# Patient Record
Sex: FEMALE | Race: WHITE | NOT HISPANIC OR LATINO | Employment: OTHER | URBAN - METROPOLITAN AREA
[De-identification: names, ages, dates, MRNs, and addresses within clinical notes are randomized per-mention and may not be internally consistent; named-entity substitution may affect disease eponyms.]

---

## 2017-10-23 ENCOUNTER — OFFICE VISIT (OUTPATIENT)
Dept: PODIATRY | Facility: CLINIC | Age: 72
End: 2017-10-23
Payer: MEDICARE

## 2017-10-23 VITALS
WEIGHT: 143.31 LBS | DIASTOLIC BLOOD PRESSURE: 76 MMHG | SYSTOLIC BLOOD PRESSURE: 121 MMHG | BODY MASS INDEX: 24.22 KG/M2 | HEART RATE: 88 BPM

## 2017-10-23 DIAGNOSIS — L60.0 INGROWN NAIL: ICD-10-CM

## 2017-10-23 DIAGNOSIS — M79.675 PAIN IN TOES OF BOTH FEET: Primary | ICD-10-CM

## 2017-10-23 DIAGNOSIS — M79.674 PAIN IN TOES OF BOTH FEET: Primary | ICD-10-CM

## 2017-10-23 DIAGNOSIS — B35.1 ONYCHOMYCOSIS DUE TO DERMATOPHYTE: ICD-10-CM

## 2017-10-23 PROCEDURE — 99212 OFFICE O/P EST SF 10 MIN: CPT | Mod: PBBFAC | Performed by: PODIATRIST

## 2017-10-23 PROCEDURE — 99214 OFFICE O/P EST MOD 30 MIN: CPT | Mod: S$PBB,,, | Performed by: PODIATRIST

## 2017-10-23 PROCEDURE — 99999 PR PBB SHADOW E&M-EST. PATIENT-LVL II: CPT | Mod: PBBFAC,,, | Performed by: PODIATRIST

## 2017-10-23 RX ORDER — PRAVASTATIN SODIUM 40 MG/1
TABLET ORAL
COMMUNITY
Start: 2017-10-16

## 2017-10-23 RX ORDER — LIOTHYRONINE SODIUM 5 UG/1
TABLET ORAL
COMMUNITY
Start: 2017-10-11

## 2017-10-23 RX ORDER — TERBINAFINE HYDROCHLORIDE 250 MG/1
250 TABLET ORAL DAILY
Qty: 30 TABLET | Refills: 0 | Status: SHIPPED | OUTPATIENT
Start: 2017-10-23 | End: 2017-11-20 | Stop reason: SDUPTHER

## 2017-10-23 RX ORDER — NAPROXEN 500 MG/1
TABLET ORAL
COMMUNITY
Start: 2017-09-03

## 2017-10-23 RX ORDER — MONTELUKAST SODIUM 10 MG/1
TABLET ORAL
COMMUNITY
Start: 2017-08-11

## 2017-10-23 RX ORDER — IRBESARTAN AND HYDROCHLOROTHIAZIDE 300; 12.5 MG/1; MG/1
TABLET, FILM COATED ORAL
COMMUNITY
Start: 2017-10-20

## 2017-10-23 RX ORDER — TRAMADOL HYDROCHLORIDE 50 MG/1
TABLET ORAL
COMMUNITY
Start: 2017-08-25

## 2017-10-23 RX ORDER — LEVOTHYROXINE SODIUM 112 UG/1
TABLET ORAL
COMMUNITY
Start: 2017-10-19

## 2017-10-23 NOTE — PROGRESS NOTES
CC:  toenails      HPI: Wendi Edmonds is a 71 y.o. female who presents today with fungal thickened hallux toenails, present for years. No treatment tried yet.    history of right foot bunionectomy and fusion.  history of right foot drop.  She would like treatment for her fungal toenails.  Also wondering if they're ingrown.      PCP:  Tatianna Muñoz MD  Last PCP visit:    Chief Complaint   Patient presents with    PCP     Wanda NUNEZ 10/2017       Ingrown Toenail     Both big toe's          Past Medical History:   Diagnosis Date    Allergic rhinitis     Anxiety 1/19/2014    Arthritis 1/19/2014    HTN (hypertension) 1/19/2014    Hypothyroidism 1/19/2014    Migraines 1/19/2014    GEORGE (obstructive sleep apnea)     Osteoarthritis     Scoliosis          Current Outpatient Prescriptions on File Prior to Visit   Medication Sig Dispense Refill    amitriptyline (ELAVIL) 25 MG tablet Take 25 mg by mouth nightly as needed for Insomnia.      azelastine (ASTELIN) 137 mcg nasal spray   10    escitalopram oxalate (LEXAPRO) 10 MG tablet Take 10 mg by mouth once daily.      fluticasone (FLONASE) 50 mcg/actuation nasal spray 2 sprays per nostril daily 1 Bottle 2    irbesartan (AVAPRO) 300 MG tablet TAKE 1 TABLET BY MOUTH EVERY DAY 90 tablet 0    levocetirizine (XYZAL) 5 MG tablet Take 1 tablet (5 mg total) by mouth every evening. 90 tablet 0    levocetirizine (XYZAL) 5 MG tablet TAKE 1 TABLET BY MOUTH EVERY EVENING 90 tablet 0    NAPROXEN SODIUM (ALEVE ORAL) Take by mouth.      pantoprazole (PROTONIX) 40 MG tablet TAKE 1 TABLET BY MOUTH EVERY DAY 90 tablet 0     No current facility-administered medications on file prior to visit.        No Known Allergies    Social History     Social History    Marital status:      Spouse name: N/A    Number of children: N/A    Years of education: N/A     Occupational History    Not on file.     Social History Main Topics    Smoking status: Never Smoker     Smokeless tobacco: Not on file    Alcohol use Not on file    Drug use: Unknown    Sexual activity: Not on file     Other Topics Concern    Not on file     Social History Narrative    No narrative on file       ROS:  Patient has no current complaints of fever, chills, sweat, chest pain, shortness of breath, nausea, vomiting.       EXAM:  Vitals:    10/23/17 1112   BP: 121/76   Pulse: 88   Weight: 65 kg (143 lb 4.8 oz)      General: Pt. is well-developed, well-nourished, appears stated age, in no acute distress, alert and oriented x 3.  Lower extremity physical exam:  Vascular: Dorsalis pedis and posterior tibial pulses are 1/4 bilaterally. 3secs capillary refill time and toes are slightly cool to touch.  There is  presence of digital hair.  There is minimal edema.  minimal varicosities.    Neurological:   Light touch, proprioception, and sharp/dull sensation are all intact bilaterally. Protective threshold with the [place]-Wienstein monofilament is diminished bilaterally.       Musculoskeletal:  Muscle strength is 4/5 on the right.   Metatarsophalangeal, subtalar, and ankle range of motion are within normal limits without crepitus bilaterally.   right 2nd toe amputation, right 3rd toe abuts hallux.   Dermatological:   bilateral hallux toenails thickened with layers of mycotic tissue, by 1-3mm with subungual debris.  Slightly incurvated, worse at the left hallux medial nail border with superficial skin tear.  No drainage and no cellulitis.         Assessment:  1. Pain in toes of both feet  Hepatic function panel    terbinafine HCl (LAMISIL) 250 mg tablet   2. Ingrown nail  Hepatic function panel   3. Onychomycosis due to dermatophyte  Hepatic function panel    terbinafine HCl (LAMISIL) 250 mg tablet         Plan:  Patient education/counseling.  Topical antibiotic left hallux and keep covered with bandaid.  Monitor.   Do not walk around barefoot and wear more supportive thick sole shoes.   See orders above.  New  LFTs in a month.    Advised patience and diligence when treating toenail fungus  Call or return to clinic prn if these symptoms worsen or fail to improve as anticipated.

## 2017-11-20 ENCOUNTER — LAB VISIT (OUTPATIENT)
Dept: LAB | Facility: HOSPITAL | Age: 72
End: 2017-11-20
Attending: PODIATRIST
Payer: MEDICARE

## 2017-11-20 DIAGNOSIS — M79.674 PAIN IN TOES OF BOTH FEET: ICD-10-CM

## 2017-11-20 DIAGNOSIS — M79.675 PAIN IN TOES OF BOTH FEET: ICD-10-CM

## 2017-11-20 DIAGNOSIS — B35.1 ONYCHOMYCOSIS DUE TO DERMATOPHYTE: ICD-10-CM

## 2017-11-20 DIAGNOSIS — L60.0 INGROWN NAIL: ICD-10-CM

## 2017-11-20 LAB
ALBUMIN SERPL BCP-MCNC: 3.7 G/DL
ALP SERPL-CCNC: 129 U/L
ALT SERPL W/O P-5'-P-CCNC: 15 U/L
AST SERPL-CCNC: 19 U/L
BILIRUB DIRECT SERPL-MCNC: 0.2 MG/DL
BILIRUB SERPL-MCNC: 0.5 MG/DL
PROT SERPL-MCNC: 7.5 G/DL

## 2017-11-20 PROCEDURE — 80076 HEPATIC FUNCTION PANEL: CPT

## 2017-11-20 PROCEDURE — 36415 COLL VENOUS BLD VENIPUNCTURE: CPT

## 2017-11-20 RX ORDER — TERBINAFINE HYDROCHLORIDE 250 MG/1
250 TABLET ORAL DAILY
Qty: 30 TABLET | Refills: 1 | Status: SHIPPED | OUTPATIENT
Start: 2017-11-20 | End: 2017-12-19 | Stop reason: SDUPTHER

## 2017-12-18 ENCOUNTER — TELEPHONE (OUTPATIENT)
Dept: PODIATRY | Facility: CLINIC | Age: 72
End: 2017-12-18

## 2017-12-18 ENCOUNTER — LAB VISIT (OUTPATIENT)
Dept: LAB | Facility: HOSPITAL | Age: 72
End: 2017-12-18
Attending: PODIATRIST
Payer: MEDICARE

## 2017-12-18 DIAGNOSIS — B35.1 ONYCHOMYCOSIS DUE TO DERMATOPHYTE: ICD-10-CM

## 2017-12-18 DIAGNOSIS — B35.1 ONYCHOMYCOSIS DUE TO DERMATOPHYTE: Primary | ICD-10-CM

## 2017-12-18 LAB
ALBUMIN SERPL BCP-MCNC: 3.8 G/DL
ALP SERPL-CCNC: 123 U/L
ALT SERPL W/O P-5'-P-CCNC: 15 U/L
AST SERPL-CCNC: 19 U/L
BILIRUB DIRECT SERPL-MCNC: 0.2 MG/DL
BILIRUB SERPL-MCNC: 0.5 MG/DL
PROT SERPL-MCNC: 7.6 G/DL

## 2017-12-18 PROCEDURE — 36415 COLL VENOUS BLD VENIPUNCTURE: CPT

## 2017-12-18 PROCEDURE — 80076 HEPATIC FUNCTION PANEL: CPT

## 2017-12-18 NOTE — TELEPHONE ENCOUNTER
----- Message from Kassie Villarreal MA sent at 12/18/2017  2:40 PM CST -----  Contact: self   Pt is requesting that rx refill get sent for terbinafine HCl (LAMISIL) 250 mg tablet to [facility] in pt chart not [facility]. Pt can be reached at [phone].

## 2017-12-18 NOTE — TELEPHONE ENCOUNTER
----- Message from Jerman Kirk sent at 12/18/2017  2:08 PM CST -----  Good afternoon. The above pt is currently in the lab and stated that she needed to complete labs, but there are no orders or appts in for her to do so. Please insert/attach all necessary orders. The patient also wanted to notify the doctor that she would like to use [facility] for any remaining prescriptions to be filled.   EXT: [id number]

## 2017-12-19 DIAGNOSIS — M79.674 PAIN IN TOES OF BOTH FEET: ICD-10-CM

## 2017-12-19 DIAGNOSIS — B35.1 ONYCHOMYCOSIS DUE TO DERMATOPHYTE: ICD-10-CM

## 2017-12-19 DIAGNOSIS — M79.675 PAIN IN TOES OF BOTH FEET: ICD-10-CM

## 2017-12-19 RX ORDER — TERBINAFINE HYDROCHLORIDE 250 MG/1
250 TABLET ORAL DAILY
Qty: 30 TABLET | Refills: 1 | Status: SHIPPED | OUTPATIENT
Start: 2017-12-19

## 2018-09-05 ENCOUNTER — OFFICE VISIT (OUTPATIENT)
Dept: PODIATRY | Facility: CLINIC | Age: 73
End: 2018-09-05
Payer: MEDICARE

## 2018-09-05 VITALS
HEART RATE: 72 BPM | WEIGHT: 150.69 LBS | SYSTOLIC BLOOD PRESSURE: 163 MMHG | DIASTOLIC BLOOD PRESSURE: 81 MMHG | BODY MASS INDEX: 25.11 KG/M2 | HEIGHT: 65 IN

## 2018-09-05 DIAGNOSIS — G60.9 IDIOPATHIC PERIPHERAL NEUROPATHY: ICD-10-CM

## 2018-09-05 DIAGNOSIS — B35.1 ONYCHOMYCOSIS DUE TO DERMATOPHYTE: ICD-10-CM

## 2018-09-05 DIAGNOSIS — M21.371 FOOT DROP, RIGHT: Primary | ICD-10-CM

## 2018-09-05 DIAGNOSIS — M20.61: ICD-10-CM

## 2018-09-05 DIAGNOSIS — R20.0 NUMBNESS OF TOES: ICD-10-CM

## 2018-09-05 DIAGNOSIS — S98.131S: ICD-10-CM

## 2018-09-05 PROCEDURE — 11721 DEBRIDE NAIL 6 OR MORE: CPT | Mod: Q9,S$PBB,, | Performed by: PODIATRIST

## 2018-09-05 PROCEDURE — 11721 DEBRIDE NAIL 6 OR MORE: CPT | Mod: Q9,PBBFAC | Performed by: PODIATRIST

## 2018-09-05 PROCEDURE — 99999 PR PBB SHADOW E&M-EST. PATIENT-LVL III: CPT | Mod: PBBFAC,,, | Performed by: PODIATRIST

## 2018-09-05 PROCEDURE — 99499 UNLISTED E&M SERVICE: CPT | Mod: S$PBB,,, | Performed by: PODIATRIST

## 2018-09-05 PROCEDURE — 99213 OFFICE O/P EST LOW 20 MIN: CPT | Mod: PBBFAC | Performed by: PODIATRIST

## 2018-09-05 RX ORDER — FLUTICASONE PROPIONATE 50 MCG
SPRAY, SUSPENSION (ML) NASAL
COMMUNITY
End: 2022-09-27

## 2018-09-05 RX ORDER — AZELASTINE 1 MG/ML
2 SPRAY, METERED NASAL
COMMUNITY

## 2018-09-05 RX ORDER — IRBESARTAN 300 MG/1
1 TABLET ORAL
COMMUNITY
End: 2022-09-13

## 2018-09-05 RX ORDER — GABAPENTIN 100 MG/1
100 CAPSULE ORAL 2 TIMES DAILY
COMMUNITY
End: 2022-10-31

## 2018-09-05 NOTE — PROGRESS NOTES
CC:  toenails      HPI: Wendi Edmonds is a 72 y.o. female who presents today with fungal thickened hallux toenails, present for years.    history of right foot bunionectomy and fusion.  Relates stiffness in this foot. Also has history of right foot drop.  She is requesting toenail trimming today. Previous trimming helped symptoms. No other pedal complains today.     PCP:  Tatianna Muñoz MD  Last PCP visit:    Chief Complaint   Patient presents with    Routine Foot Care     bilateral foot     Nail Care    Nail Problem     bilateral foot           Past Medical History:   Diagnosis Date    Allergic rhinitis     Anxiety 1/19/2014    Arthritis 1/19/2014    HTN (hypertension) 1/19/2014    Hypothyroidism 1/19/2014    Migraines 1/19/2014    GEORGE (obstructive sleep apnea)     Osteoarthritis     Scoliosis          Current Outpatient Medications on File Prior to Visit   Medication Sig Dispense Refill    amitriptyline (ELAVIL) 25 MG tablet Take 25 mg by mouth nightly as needed for Insomnia.      azelastine (ASTELIN) 137 mcg (0.1 %) nasal spray 2 sprays by Nasal route.      azelastine (ASTELIN) 137 mcg nasal spray   10    fluticasone (FLONASE) 50 mcg/actuation nasal spray 2 sprays per nostril daily 1 Bottle 2    fluticasone (FLONASE) 50 mcg/actuation nasal spray spray 1 spray by intranasal route  every day in each nostril      gabapentin (NEURONTIN) 100 MG capsule Take 100 mg by mouth 2 (two) times daily.      irbesartan (AVAPRO) 300 MG tablet TAKE 1 TABLET BY MOUTH EVERY DAY 90 tablet 0    irbesartan (AVAPRO) 300 MG tablet Take 1 tablet by mouth.      irbesartan-hydrochlorothiazide (AVALIDE) 300-12.5 mg per tablet       levocetirizine (XYZAL) 5 MG tablet Take 1 tablet (5 mg total) by mouth every evening. 90 tablet 0    levocetirizine (XYZAL) 5 MG tablet TAKE 1 TABLET BY MOUTH EVERY EVENING 90 tablet 0    levothyroxine (SYNTHROID) 112 MCG tablet       liothyronine (CYTOMEL) 5 MCG Tab        "montelukast (SINGULAIR) 10 mg tablet       pantoprazole (PROTONIX) 40 MG tablet TAKE 1 TABLET BY MOUTH EVERY DAY 90 tablet 0    pravastatin (PRAVACHOL) 40 MG tablet       tramadol (ULTRAM) 50 mg tablet       escitalopram oxalate (LEXAPRO) 10 MG tablet Take 10 mg by mouth once daily.      naproxen (NAPROSYN) 500 MG tablet       NAPROXEN SODIUM (ALEVE ORAL) Take by mouth.      terbinafine HCl (LAMISIL) 250 mg tablet Take 1 tablet (250 mg total) by mouth once daily. 1 pill by mouth for fungal toenails.  Avoid alcohol intake while taking medication 30 tablet 1     No current facility-administered medications on file prior to visit.        No Known Allergies    Social History     Socioeconomic History    Marital status:      Spouse name: Not on file    Number of children: Not on file    Years of education: Not on file    Highest education level: Not on file   Social Needs    Financial resource strain: Not on file    Food insecurity - worry: Not on file    Food insecurity - inability: Not on file    Transportation needs - medical: Not on file    Transportation needs - non-medical: Not on file   Occupational History    Not on file   Tobacco Use    Smoking status: Never Smoker   Substance and Sexual Activity    Alcohol use: Not on file    Drug use: Not on file    Sexual activity: Not on file   Other Topics Concern    Not on file   Social History Narrative    Not on file       ROS:  Patient has no current complaints of fever, chills, sweat, chest pain, shortness of breath, nausea, vomiting.       EXAM:  Vitals:    09/05/18 1203   BP: (!) 163/81   Pulse: 72   Weight: 68.4 kg (150 lb 11.2 oz)   Height: 5' 4.5" (1.638 m)      General: Pt. is well-developed, well-nourished, appears stated age, in no acute distress, alert and oriented x 3.  Lower extremity physical exam:  Vascular: Dorsalis pedis and posterior tibial pulses are 1/4 bilaterally. 3secs capillary refill time and toes are slightly cool " to touch.  There is diminished digital hair.  There is minimal edema.  minimal varicosities.    Neurological:   Light touch, proprioception, and sharp/dull sensation are all intact bilaterally. Protective threshold with the [place]-Wienstein monofilament is diminished bilaterally.       Musculoskeletal:  Muscle strength in DF/Inv is 4/5 on the right 5/5 left.   Metatarsophalangeal, subtalar, and ankle range of motion are within normal limits without crepitus bilaterally. Previously healed right 2nd toe amputation, right 3rd toe abducted and DF at MTPJ abutting hallux.   Dermatological:   bilateral toenails 1-5 thickened, elongated, with discoloration, subungual debris, brittleness.  Slightly incurvated b/l borders of b/l hallux. No open wound, no SOI, no drainage and no erythema.         Assessment:  1. Foot drop, right  ANKLE FOOT ORTHOSIS FOR HOME USE   2. Amputation, traumatic, toe, right, sequela     3. Deformity of phalanx of toe, right     4. Onychomycosis due to dermatophyte     5. Numbness of toes  ANKLE FOOT ORTHOSIS FOR HOME USE   6. Idiopathic peripheral neuropathy  ANKLE FOOT ORTHOSIS FOR HOME USE         Plan:    - Shoe inspection. General Foot Education. Patient reminded of the importance of good nutrition. Patient instructed on proper foot hygeine. We discussed wearing proper shoe gear, daily foot inspections, never walking without protective shoe gear, caution putting sharp instruments to feet     - Discussed general foot care:  Wear comfortable, proper fitting shoes. Wash feet daily. Dry well. After drying, apply moisturizer to feet (no lotion to webspaces). Inspect feet daily for skin breaks, blisters, swelling, or redness. Wear cotton socks (preferably white)  Change socks every day. Do NOT walk barefoot. Do NOT use heating pads or warm/hot water soaks     With patient's permission, nails were aggressively reduced and debrided 1,2,3,4, 5 R and 1,2, 3,4,5 L and filed to their soft tissue attachment  mechanically and with electric , removing all offending nail and debris. Patient tolerated this well and no blood was drawn. Patient reports relief following the procedure.     Coban tape (with no compression) to syeda splint 3rd and 4th toes together.     Rx for Solid AFO for R foot drop.     RTC 3 months, sooner PRN

## 2018-11-06 ENCOUNTER — OFFICE VISIT (OUTPATIENT)
Dept: PODIATRY | Facility: CLINIC | Age: 73
End: 2018-11-06
Payer: MEDICARE

## 2018-11-06 VITALS
HEIGHT: 64 IN | SYSTOLIC BLOOD PRESSURE: 145 MMHG | HEART RATE: 69 BPM | WEIGHT: 150 LBS | BODY MASS INDEX: 25.61 KG/M2 | DIASTOLIC BLOOD PRESSURE: 71 MMHG

## 2018-11-06 DIAGNOSIS — L60.3 ONYCHODYSTROPHY: Primary | ICD-10-CM

## 2018-11-06 PROCEDURE — 99999 PR PBB SHADOW E&M-EST. PATIENT-LVL III: CPT | Mod: PBBFAC,,, | Performed by: PODIATRIST

## 2018-11-06 PROCEDURE — 99213 OFFICE O/P EST LOW 20 MIN: CPT | Mod: PBBFAC | Performed by: PODIATRIST

## 2018-11-06 PROCEDURE — 17999 UNLISTD PX SKN MUC MEMB SUBQ: CPT | Mod: CSM,,, | Performed by: PODIATRIST

## 2018-11-06 RX ORDER — LEVOCETIRIZINE DIHYDROCHLORIDE 5 MG/1
1 TABLET, FILM COATED ORAL
COMMUNITY

## 2018-11-06 RX ORDER — PANTOPRAZOLE SODIUM 40 MG/1
1 TABLET, DELAYED RELEASE ORAL
COMMUNITY

## 2019-01-07 ENCOUNTER — TELEPHONE (OUTPATIENT)
Dept: PODIATRY | Facility: CLINIC | Age: 74
End: 2019-01-07

## 2019-01-07 NOTE — TELEPHONE ENCOUNTER
----- Message from Jayesh Raygoza sent at 1/7/2019  1:32 PM CST -----  Contact: Self/ [phone]  Patient would like a call back to change her appt for a later date.    called pt to try to muir her appointment for a later date left message to call us when she would like appointment we can not make appointment for any date we need you to let us  know

## 2019-06-26 ENCOUNTER — OFFICE VISIT (OUTPATIENT)
Dept: PODIATRY | Facility: CLINIC | Age: 74
End: 2019-06-26
Payer: MEDICARE

## 2019-06-26 VITALS
HEIGHT: 65 IN | DIASTOLIC BLOOD PRESSURE: 73 MMHG | BODY MASS INDEX: 24.99 KG/M2 | WEIGHT: 150 LBS | SYSTOLIC BLOOD PRESSURE: 155 MMHG | HEART RATE: 62 BPM

## 2019-06-26 DIAGNOSIS — B35.1 ONYCHOMYCOSIS DUE TO DERMATOPHYTE: Primary | ICD-10-CM

## 2019-06-26 PROCEDURE — 99999 PR PBB SHADOW E&M-EST. PATIENT-LVL IV: ICD-10-PCS | Mod: PBBFAC,,, | Performed by: PODIATRIST

## 2019-06-26 PROCEDURE — 99214 OFFICE O/P EST MOD 30 MIN: CPT | Mod: PBBFAC | Performed by: PODIATRIST

## 2019-06-26 PROCEDURE — 99499 UNLISTED E&M SERVICE: CPT | Mod: ,,, | Performed by: PODIATRIST

## 2019-06-26 PROCEDURE — 17999 PR NON-COVERED FOOT CARE: ICD-10-PCS | Mod: CSM,,, | Performed by: PODIATRIST

## 2019-06-26 PROCEDURE — 17999 UNLISTD PX SKN MUC MEMB SUBQ: CPT | Mod: CSM,,, | Performed by: PODIATRIST

## 2019-06-26 PROCEDURE — 99999 PR PBB SHADOW E&M-EST. PATIENT-LVL IV: CPT | Mod: PBBFAC,,, | Performed by: PODIATRIST

## 2019-06-26 PROCEDURE — 99499 NO LOS: ICD-10-PCS | Mod: ,,, | Performed by: PODIATRIST

## 2019-07-08 NOTE — PROGRESS NOTES
Patient presents to the clinic for non-covered routine foot care. Patient is not a high risk foot care patient. Patient understands this is not typically a covered service and patient is aware of responsibility of payment. Pedal pulses are palpable. No know risk factors requiring routine foot care. Nails are elongated and thickened on feet. Diagnosis is onychodystrophy. Nails were reduced and trimmed bilaterally. Patient tolerated well.     RTC 2-3 months for proc B, sooner PRN

## 2019-11-04 ENCOUNTER — TELEPHONE (OUTPATIENT)
Dept: PODIATRY | Facility: CLINIC | Age: 74
End: 2019-11-04

## 2019-11-04 ENCOUNTER — OFFICE VISIT (OUTPATIENT)
Dept: PODIATRY | Facility: CLINIC | Age: 74
End: 2019-11-04
Payer: MEDICARE

## 2019-11-04 VITALS
HEART RATE: 86 BPM | SYSTOLIC BLOOD PRESSURE: 160 MMHG | HEIGHT: 65 IN | WEIGHT: 155.44 LBS | DIASTOLIC BLOOD PRESSURE: 77 MMHG | BODY MASS INDEX: 25.9 KG/M2

## 2019-11-04 DIAGNOSIS — M79.5 FOREIGN BODY (FB) IN SOFT TISSUE: ICD-10-CM

## 2019-11-04 DIAGNOSIS — G60.9 IDIOPATHIC PERIPHERAL NEUROPATHY: Primary | ICD-10-CM

## 2019-11-04 DIAGNOSIS — L03.031 CELLULITIS OF GREAT TOE, RIGHT: ICD-10-CM

## 2019-11-04 PROCEDURE — 99213 OFFICE O/P EST LOW 20 MIN: CPT | Mod: PBBFAC,25 | Performed by: PODIATRIST

## 2019-11-04 PROCEDURE — 99999 PR PBB SHADOW E&M-EST. PATIENT-LVL III: CPT | Mod: PBBFAC,,, | Performed by: PODIATRIST

## 2019-11-04 PROCEDURE — 10120 PR REMOVE FOREIGN BODY SIMPLE: ICD-10-PCS | Mod: S$PBB,,, | Performed by: PODIATRIST

## 2019-11-04 PROCEDURE — 99214 OFFICE O/P EST MOD 30 MIN: CPT | Mod: 25,S$PBB,, | Performed by: PODIATRIST

## 2019-11-04 PROCEDURE — 10120 INC&RMVL FB SUBQ TISS SMPL: CPT | Mod: S$PBB,,, | Performed by: PODIATRIST

## 2019-11-04 PROCEDURE — 99214 PR OFFICE/OUTPT VISIT, EST, LEVL IV, 30-39 MIN: ICD-10-PCS | Mod: 25,S$PBB,, | Performed by: PODIATRIST

## 2019-11-04 PROCEDURE — 99999 PR PBB SHADOW E&M-EST. PATIENT-LVL III: ICD-10-PCS | Mod: PBBFAC,,, | Performed by: PODIATRIST

## 2019-11-04 PROCEDURE — 10120 INC&RMVL FB SUBQ TISS SMPL: CPT | Mod: PBBFAC | Performed by: PODIATRIST

## 2019-11-04 RX ORDER — MUPIROCIN 20 MG/G
OINTMENT TOPICAL 2 TIMES DAILY
Qty: 30 G | Refills: 1 | Status: SHIPPED | OUTPATIENT
Start: 2019-11-04 | End: 2019-11-14

## 2019-11-04 RX ORDER — DOXYCYCLINE 100 MG/1
100 CAPSULE ORAL EVERY 12 HOURS
Qty: 14 CAPSULE | Refills: 0 | Status: SHIPPED | OUTPATIENT
Start: 2019-11-04 | End: 2019-11-11
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
## 2019-11-04 NOTE — TELEPHONE ENCOUNTER
Call placed to preferred number.  Msg & contact info left on recorder to contact our office re need to r/s appt due to DPM's illness.

## 2019-11-13 ENCOUNTER — HOSPITAL ENCOUNTER (OUTPATIENT)
Dept: RADIOLOGY | Facility: HOSPITAL | Age: 74
Discharge: HOME | End: 2019-11-13
Attending: PODIATRIST
Payer: MEDICARE

## 2019-11-13 ENCOUNTER — OFFICE VISIT (OUTPATIENT)
Dept: PODIATRY | Facility: CLINIC | Age: 74
End: 2019-11-13
Payer: MEDICARE

## 2019-11-13 VITALS
DIASTOLIC BLOOD PRESSURE: 76 MMHG | WEIGHT: 155 LBS | SYSTOLIC BLOOD PRESSURE: 143 MMHG | HEART RATE: 84 BPM | BODY MASS INDEX: 24.91 KG/M2 | HEIGHT: 66 IN

## 2019-11-13 DIAGNOSIS — M79.5 FOREIGN BODY (FB) IN SOFT TISSUE: Primary | ICD-10-CM

## 2019-11-13 DIAGNOSIS — M79.5 FOREIGN BODY (FB) IN SOFT TISSUE: ICD-10-CM

## 2019-11-13 PROCEDURE — 99024 PR POST-OP FOLLOW-UP VISIT: ICD-10-PCS | Mod: S$PBB,,, | Performed by: PODIATRIST

## 2019-11-13 PROCEDURE — 99999 PR PBB SHADOW E&M-EST. PATIENT-LVL III: ICD-10-PCS | Mod: PBBFAC,,, | Performed by: PODIATRIST

## 2019-11-13 PROCEDURE — 1159F PR MEDICATION LIST DOCUMENTED IN MEDICAL RECORD: ICD-10-PCS | Mod: ,,, | Performed by: PODIATRIST

## 2019-11-13 PROCEDURE — 1125F PR PAIN SEVERITY QUANTIFIED, PAIN PRESENT: ICD-10-PCS | Mod: ,,, | Performed by: PODIATRIST

## 2019-11-13 PROCEDURE — 73630 X-RAY EXAM OF FOOT: CPT | Mod: TC,RT

## 2019-11-13 PROCEDURE — 73630 X-RAY EXAM OF FOOT: CPT | Mod: 26,RT,, | Performed by: RADIOLOGY

## 2019-11-13 PROCEDURE — 99999 PR PBB SHADOW E&M-EST. PATIENT-LVL III: CPT | Mod: PBBFAC,,, | Performed by: PODIATRIST

## 2019-11-13 PROCEDURE — 99213 OFFICE O/P EST LOW 20 MIN: CPT | Mod: PBBFAC,25 | Performed by: PODIATRIST

## 2019-11-13 PROCEDURE — 1125F AMNT PAIN NOTED PAIN PRSNT: CPT | Mod: ,,, | Performed by: PODIATRIST

## 2019-11-13 PROCEDURE — 73630 XR FOOT COMPLETE 3 VIEW RIGHT: ICD-10-PCS | Mod: 26,RT,, | Performed by: RADIOLOGY

## 2019-11-13 PROCEDURE — 1159F MED LIST DOCD IN RCRD: CPT | Mod: ,,, | Performed by: PODIATRIST

## 2019-11-13 PROCEDURE — 99024 POSTOP FOLLOW-UP VISIT: CPT | Mod: S$PBB,,, | Performed by: PODIATRIST

## 2019-11-13 RX ORDER — AMOXICILLIN AND CLAVULANATE POTASSIUM 875; 125 MG/1; MG/1
1 TABLET, FILM COATED ORAL 2 TIMES DAILY
Qty: 20 TABLET | Refills: 0 | Status: SHIPPED | OUTPATIENT
Start: 2019-11-13

## 2019-11-19 NOTE — PROGRESS NOTES
"Subjective:      Patient ID: Wendi Edmonds is a 73 y.o. female.    Chief Complaint: Foot Problem (glass in rt ft) and Foot Injury    Wendi is a 73 y.o. female who presents to the podiatry clinic  with complaint of  right foot pain and redness. Pt states she saw Dr GOMEZ and some glass was removed from her right big toe, pt states the area is still painful.   Review of Systems   Constitution: Negative for chills, decreased appetite and fever.   Cardiovascular: Negative for leg swelling.   Musculoskeletal: Negative for arthritis, joint pain, joint swelling and myalgias.   Gastrointestinal: Negative for nausea and vomiting.   Neurological: Positive for numbness and paresthesias. Negative for loss of balance.           Objective:       Vitals:    11/13/19 0856   BP: (!) 143/76   Pulse: 84   Weight: 70.3 kg (155 lb)   Height: 5' 6" (1.676 m)   PainSc:   5        Physical Exam   Constitutional: She is oriented to person, place, and time. She appears well-developed and well-nourished.   Cardiovascular:   Pulses:       Dorsalis pedis pulses are 2+ on the right side, and 2+ on the left side.        Posterior tibial pulses are 2+ on the right side, and 2+ on the left side.   Musculoskeletal: She exhibits no edema or tenderness.        Right ankle: Normal.        Left ankle: Normal.        Right foot: There is no swelling, no crepitus and no deformity.        Left foot: There is no swelling, no crepitus and no deformity.   Adequate joint range of motion without pain, limitation, nor crepitation Bilateral feet and ankle joints. Muscle strength is 5/5 in all groups bilaterally.    POP to right plantar hallux     Lymphadenopathy:   No palpable lymph nodes   Neurological: She is alert and oriented to person, place, and time. She has normal strength.   [place]-Omari 5.07 monofilamant testing is diminished Sandro feet.      Skin: Skin is warm, dry and intact. No rash noted. No erythema. Nails show no clubbing.   R distal hallux " erythema, mild   Mild HKT buildup noted to right plantar hallux    Psychiatric: She has a normal mood and affect. Her behavior is normal.             Assessment:       Encounter Diagnosis   Name Primary?    Foreign body (FB) in soft tissue Yes         Plan:       Wendi was seen today for foot problem and foot injury.    Diagnoses and all orders for this visit:    Foreign body (FB) in soft tissue  -     X-Ray Foot Complete 3 view Right; Future    Other orders  -     amoxicillin-clavulanate 875-125mg (AUGMENTIN) 875-125 mg per tablet; Take 1 tablet by mouth 2 (two) times daily.      I counseled the patient on her conditions, their implications and medical management.    Mild cellulitis R hallux w/ glass sliver noted   Rx augmentin  X-rays taken and reviewed, no fx, FB, or dislocations noted.   Toe sleeve dispensed to pt  Pt advised MRI would be needed if pain persists  Call or return to clinic prn if these symptoms worsen or fail to improve as anticipated.          .

## 2019-11-20 ENCOUNTER — OFFICE VISIT (OUTPATIENT)
Dept: PODIATRY | Facility: CLINIC | Age: 74
End: 2019-11-20
Payer: MEDICARE

## 2019-11-20 VITALS
SYSTOLIC BLOOD PRESSURE: 128 MMHG | HEIGHT: 67 IN | HEART RATE: 86 BPM | DIASTOLIC BLOOD PRESSURE: 72 MMHG | WEIGHT: 155 LBS | BODY MASS INDEX: 24.33 KG/M2

## 2019-11-20 DIAGNOSIS — M79.674 PAIN OF TOE OF RIGHT FOOT: Primary | ICD-10-CM

## 2019-11-20 DIAGNOSIS — M79.5 FOREIGN BODY (FB) IN SOFT TISSUE: ICD-10-CM

## 2019-11-20 PROCEDURE — 1126F PR PAIN SEVERITY QUANTIFIED, NO PAIN PRESENT: ICD-10-PCS | Mod: ,,, | Performed by: PODIATRIST

## 2019-11-20 PROCEDURE — 1159F PR MEDICATION LIST DOCUMENTED IN MEDICAL RECORD: ICD-10-PCS | Mod: ,,, | Performed by: PODIATRIST

## 2019-11-20 PROCEDURE — 1159F MED LIST DOCD IN RCRD: CPT | Mod: ,,, | Performed by: PODIATRIST

## 2019-11-20 PROCEDURE — 99214 OFFICE O/P EST MOD 30 MIN: CPT | Mod: PBBFAC | Performed by: PODIATRIST

## 2019-11-20 PROCEDURE — 99999 PR PBB SHADOW E&M-EST. PATIENT-LVL IV: ICD-10-PCS | Mod: PBBFAC,,, | Performed by: PODIATRIST

## 2019-11-20 PROCEDURE — 99213 PR OFFICE/OUTPT VISIT, EST, LEVL III, 20-29 MIN: ICD-10-PCS | Mod: S$PBB,,, | Performed by: PODIATRIST

## 2019-11-20 PROCEDURE — 1126F AMNT PAIN NOTED NONE PRSNT: CPT | Mod: ,,, | Performed by: PODIATRIST

## 2019-11-20 PROCEDURE — 99999 PR PBB SHADOW E&M-EST. PATIENT-LVL IV: CPT | Mod: PBBFAC,,, | Performed by: PODIATRIST

## 2019-11-20 PROCEDURE — 99213 OFFICE O/P EST LOW 20 MIN: CPT | Mod: S$PBB,,, | Performed by: PODIATRIST

## 2019-11-21 ENCOUNTER — HOSPITAL ENCOUNTER (OUTPATIENT)
Dept: RADIOLOGY | Facility: HOSPITAL | Age: 74
Discharge: HOME | End: 2019-11-21
Attending: PODIATRIST
Payer: MEDICARE

## 2019-11-21 DIAGNOSIS — M79.674 PAIN OF TOE OF RIGHT FOOT: ICD-10-CM

## 2019-11-21 DIAGNOSIS — M79.5 FOREIGN BODY (FB) IN SOFT TISSUE: ICD-10-CM

## 2019-11-21 PROCEDURE — 73701 CT LOWER EXTREMITY W/DYE: CPT | Mod: TC,RT

## 2019-11-21 PROCEDURE — 73701 CT LOWER EXTREMITY W/DYE: CPT | Mod: 26,RT,, | Performed by: RADIOLOGY

## 2019-11-21 PROCEDURE — 25500020 PHARM REV CODE 255: Performed by: PODIATRIST

## 2019-11-21 PROCEDURE — 73701 CT FOOT WITH CONTRAST RIGHT: ICD-10-PCS | Mod: 26,RT,, | Performed by: RADIOLOGY

## 2019-11-21 RX ADMIN — IOHEXOL 75 ML: 350 INJECTION, SOLUTION INTRAVENOUS at 11:11

## 2019-11-25 ENCOUNTER — TELEPHONE (OUTPATIENT)
Dept: PODIATRY | Facility: CLINIC | Age: 74
End: 2019-11-25

## 2019-11-25 NOTE — TELEPHONE ENCOUNTER
----- Message from Tatyana Nazario sent at 11/25/2019  7:28 AM CST -----  Contact: pt  Type:  Test Results    Who Called:  Pt   Name of Test (Lab/Mammo/Etc): Mri  Date of Test: 11/21/19  Ordering Provider: Dr Sanchez  Where the test was performed:  [facility]  Would the patient rather a call back or a response via [facility]?   Best Call Back Number:[phone]  Additional Information:

## 2019-12-02 NOTE — PROGRESS NOTES
"Subjective:      Patient ID: Wendi Edmonds is a 73 y.o. female.    Chief Complaint: Foot Injury and Follow-up    Wendi is a 73 y.o. female who presents to the podiatry clinic  with complaint of  right foot pain and redness. Pt states she still feels like something is in her foot.       Review of Systems   Constitution: Negative for chills, decreased appetite and fever.   Cardiovascular: Negative for leg swelling.   Musculoskeletal: Negative for arthritis, joint pain, joint swelling and myalgias.   Gastrointestinal: Negative for nausea and vomiting.   Neurological: Positive for numbness and paresthesias. Negative for loss of balance.           Objective:       Vitals:    11/20/19 0756   BP: 128/72   Pulse: 86   Weight: 70.3 kg (155 lb)   Height: 5' 7.2" (1.707 m)   PainSc: 0-No pain        Physical Exam   Constitutional: She is oriented to person, place, and time. She appears well-developed and well-nourished.   Cardiovascular:   Pulses:       Dorsalis pedis pulses are 2+ on the right side, and 2+ on the left side.        Posterior tibial pulses are 2+ on the right side, and 2+ on the left side.   Musculoskeletal: She exhibits no edema or tenderness.        Right ankle: Normal.        Left ankle: Normal.        Right foot: There is no swelling, no crepitus and no deformity.        Left foot: There is no swelling, no crepitus and no deformity.   Adequate joint range of motion without pain, limitation, nor crepitation Bilateral feet and ankle joints. Muscle strength is 5/5 in all groups bilaterally.    POP to right plantar hallux     Lymphadenopathy:   No palpable lymph nodes   Neurological: She is alert and oriented to person, place, and time. She has normal strength.   [place]-Omari 5.07 monofilamant testing is diminished Sandro feet.      Skin: Skin is warm, dry and intact. No rash noted. No erythema. Nails show no clubbing.   R distal hallux erythema, resolved   Psychiatric: She has a normal mood and affect. " Her behavior is normal.             Assessment:       Encounter Diagnoses   Name Primary?    Pain of toe of right foot Yes    Foreign body (FB) in soft tissue - Right Foot          Plan:       Wendi was seen today for foot injury and follow-up.    Diagnoses and all orders for this visit:    Pain of toe of right foot  -     Cancel: CT Foot W W/O Contrast Right; Future  -     Creatinine, serum; Future    Foreign body (FB) in soft tissue - Right Foot  -     Cancel: CT Foot W W/O Contrast Right; Future  -     Creatinine, serum; Future      I counseled the patient on her conditions, their implications and medical management.    CT scan ordered with appropriate labs  Pt advised to continue to wear toe sleeve  Will f/u after CT  Pt advised on RICE and OTC NSAIDs for associated pain.             .

## 2021-08-04 NOTE — PROGRESS NOTES
Patient presents to the clinic for non-covered routine foot care. Patient is not a high risk foot care patient. Patient understands this is not typically a covered service and patient is aware of responsibility of payment. Pedal pulses are palpable. No know risk factors requiring routine foot care. Nails are elongated and thickened on feet. Diagnosis is onychodystrophy. Nails were reduced and trimmed bilaterally. Patient tolerated well.     RTC 2-3 months for proc B, sooner PRN           59

## 2022-09-09 ENCOUNTER — TELEPHONE (OUTPATIENT)
Dept: NEUROLOGY | Facility: CLINIC | Age: 77
End: 2022-09-09
Payer: MEDICARE

## 2022-09-09 NOTE — TELEPHONE ENCOUNTER
----- Message from Priscilla Lopez MA sent at 9/9/2022 10:20 AM CDT -----  Contact: Wendi  Patient is requesting a call back to schedule an appointment with a memory provider. Patient stated she has been experiencing memory loss since having Covid. Please call patient to assist her with getting scheduled.        Confirmed Contact below:  Contact Name:Wendi Edmonds  Phone Number: [phone]

## 2022-09-13 ENCOUNTER — LAB VISIT (OUTPATIENT)
Dept: LAB | Facility: HOSPITAL | Age: 77
End: 2022-09-13
Payer: MEDICARE

## 2022-09-13 ENCOUNTER — OFFICE VISIT (OUTPATIENT)
Dept: NEUROLOGY | Facility: CLINIC | Age: 77
End: 2022-09-13
Payer: MEDICARE

## 2022-09-13 VITALS — HEIGHT: 66 IN | WEIGHT: 159.31 LBS | BODY MASS INDEX: 25.6 KG/M2

## 2022-09-13 DIAGNOSIS — G31.84 MILD COGNITIVE IMPAIRMENT WITH MEMORY LOSS: ICD-10-CM

## 2022-09-13 DIAGNOSIS — R41.3 MEMORY DIFFICULTIES: ICD-10-CM

## 2022-09-13 DIAGNOSIS — R41.9 UNSPECIFIED SYMPTOMS AND SIGNS INVOLVING COGNITIVE FUNCTIONS AND AWARENESS: ICD-10-CM

## 2022-09-13 DIAGNOSIS — G47.33 OSA (OBSTRUCTIVE SLEEP APNEA): ICD-10-CM

## 2022-09-13 DIAGNOSIS — Z91.81 AT RISK FOR FALLS: ICD-10-CM

## 2022-09-13 DIAGNOSIS — G47.33 OSA ON CPAP: ICD-10-CM

## 2022-09-13 DIAGNOSIS — R53.83 MALAISE AND FATIGUE: ICD-10-CM

## 2022-09-13 DIAGNOSIS — R53.81 MALAISE AND FATIGUE: ICD-10-CM

## 2022-09-13 DIAGNOSIS — R41.3 OTHER AMNESIA: Primary | ICD-10-CM

## 2022-09-13 PROCEDURE — 99999 PR PBB SHADOW E&M-EST. PATIENT-LVL V: CPT | Mod: PBBFAC,GC,,

## 2022-09-13 PROCEDURE — 84425 ASSAY OF VITAMIN B-1: CPT

## 2022-09-13 PROCEDURE — 99999 PR PBB SHADOW E&M-EST. PATIENT-LVL V: ICD-10-PCS | Mod: PBBFAC,GC,,

## 2022-09-13 PROCEDURE — 36415 COLL VENOUS BLD VENIPUNCTURE: CPT

## 2022-09-13 PROCEDURE — 99205 PR OFFICE/OUTPT VISIT, NEW, LEVL V, 60-74 MIN: ICD-10-PCS | Mod: S$PBB,GC,,

## 2022-09-13 PROCEDURE — 99215 OFFICE O/P EST HI 40 MIN: CPT | Mod: PBBFAC

## 2022-09-13 PROCEDURE — 99205 OFFICE O/P NEW HI 60 MIN: CPT | Mod: S$PBB,GC,,

## 2022-09-13 RX ORDER — DONEPEZIL HYDROCHLORIDE 10 MG/1
1 TABLET, FILM COATED ORAL DAILY
COMMUNITY
Start: 2022-07-05 | End: 2022-10-31

## 2022-09-13 RX ORDER — CEPHALEXIN 500 MG/1
500 CAPSULE ORAL
COMMUNITY
Start: 2022-09-07 | End: 2022-09-17

## 2022-09-13 RX ORDER — FAMOTIDINE 20 MG/1
20 TABLET, FILM COATED ORAL
COMMUNITY

## 2022-09-13 RX ORDER — BUDESONIDE 0.5 MG/2ML
INHALANT ORAL
COMMUNITY
Start: 2022-08-10

## 2022-09-13 RX ORDER — CELECOXIB 200 MG/1
1 CAPSULE ORAL
COMMUNITY
Start: 2022-06-15

## 2022-09-13 RX ORDER — ALBUTEROL SULFATE 90 UG/1
2 AEROSOL, METERED RESPIRATORY (INHALATION) EVERY 4 HOURS PRN
COMMUNITY
Start: 2022-08-01

## 2022-09-13 RX ORDER — ALENDRONATE SODIUM 70 MG/1
TABLET ORAL
COMMUNITY
Start: 2022-02-16

## 2022-09-13 NOTE — PATIENT INSTRUCTIONS
Please continue to use CPAP, get in contact about a possible nasal pillow. Also, discuss the INSPIRE implant for Sleep Apnea.    We are going some vitamins in your blood.    Also, please follow up with MRI and neuropsychology assessment.
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
## 2022-09-15 LAB — VIT B12 SERPL-MCNC: 1127 NG/L (ref 180–914)

## 2022-09-15 NOTE — PROGRESS NOTES
I have seen the patient, reviewed the Resident's history and physical, assessment, and plan. I have personally interviewed and examined the patient at bedside and agree with the findings.       David Castelan MD  Neurology  [facility] - Neurology 7th Fl

## 2022-09-19 ENCOUNTER — TELEPHONE (OUTPATIENT)
Dept: NEUROLOGY | Facility: CLINIC | Age: 77
End: 2022-09-19
Payer: MEDICARE

## 2022-09-19 LAB — VIT B1 BLD-MCNC: 71 UG/L (ref 38–122)

## 2022-09-21 ENCOUNTER — HOSPITAL ENCOUNTER (OUTPATIENT)
Dept: RADIOLOGY | Facility: HOSPITAL | Age: 77
Discharge: HOME | End: 2022-09-21
Payer: MEDICARE

## 2022-09-21 DIAGNOSIS — G31.84 MILD COGNITIVE IMPAIRMENT WITH MEMORY LOSS: ICD-10-CM

## 2022-09-21 DIAGNOSIS — R41.3 OTHER AMNESIA: ICD-10-CM

## 2022-09-21 PROCEDURE — A9585 GADOBUTROL INJECTION: HCPCS

## 2022-09-21 PROCEDURE — 25500020 PHARM REV CODE 255

## 2022-09-21 PROCEDURE — 70553 MRI BRAIN STEM W/O & W/DYE: CPT | Mod: 26,,, | Performed by: RADIOLOGY

## 2022-09-21 PROCEDURE — 70553 MRI BRAIN STEM W/O & W/DYE: CPT | Mod: TC

## 2022-09-21 PROCEDURE — 70553 MRI BRAIN W WO CONTRAST: ICD-10-PCS | Mod: 26,,, | Performed by: RADIOLOGY

## 2022-09-21 RX ORDER — GADOBUTROL 604.72 MG/ML
7 INJECTION INTRAVENOUS
Status: COMPLETED | OUTPATIENT
Start: 2022-09-21 | End: 2022-09-21

## 2022-09-21 RX ADMIN — GADOBUTROL 7 ML: 604.72 INJECTION INTRAVENOUS at 10:09

## 2022-09-27 ENCOUNTER — OFFICE VISIT (OUTPATIENT)
Dept: OTOLARYNGOLOGY | Facility: CLINIC | Age: 77
End: 2022-09-27
Payer: MEDICARE

## 2022-09-27 VITALS — WEIGHT: 158.31 LBS | HEIGHT: 66 IN | BODY MASS INDEX: 25.44 KG/M2

## 2022-09-27 DIAGNOSIS — J30.89 PERENNIAL ALLERGIC RHINITIS WITH SEASONAL VARIATION: Primary | ICD-10-CM

## 2022-09-27 DIAGNOSIS — R09.82 POSTNASAL DRIP: ICD-10-CM

## 2022-09-27 DIAGNOSIS — J30.2 PERENNIAL ALLERGIC RHINITIS WITH SEASONAL VARIATION: Primary | ICD-10-CM

## 2022-09-27 DIAGNOSIS — K21.9 LARYNGOPHARYNGEAL REFLUX (LPR): ICD-10-CM

## 2022-09-27 PROCEDURE — 99999 PR PBB SHADOW E&M-EST. PATIENT-LVL V: CPT | Mod: PBBFAC,,, | Performed by: OTOLARYNGOLOGY

## 2022-09-27 PROCEDURE — 99204 PR OFFICE/OUTPT VISIT, NEW, LEVL IV, 45-59 MIN: ICD-10-PCS | Mod: 25,S$PBB,, | Performed by: OTOLARYNGOLOGY

## 2022-09-27 PROCEDURE — 99215 OFFICE O/P EST HI 40 MIN: CPT | Mod: PBBFAC | Performed by: OTOLARYNGOLOGY

## 2022-09-27 PROCEDURE — 99999 PR PBB SHADOW E&M-EST. PATIENT-LVL V: ICD-10-PCS | Mod: PBBFAC,,, | Performed by: OTOLARYNGOLOGY

## 2022-09-27 PROCEDURE — 31575 DIAGNOSTIC LARYNGOSCOPY: CPT | Mod: PBBFAC | Performed by: OTOLARYNGOLOGY

## 2022-09-27 PROCEDURE — 99204 OFFICE O/P NEW MOD 45 MIN: CPT | Mod: 25,S$PBB,, | Performed by: OTOLARYNGOLOGY

## 2022-09-27 PROCEDURE — 31575 LARYNGOSCOPY: ICD-10-PCS | Mod: S$PBB,,, | Performed by: OTOLARYNGOLOGY

## 2022-09-27 RX ORDER — BECLOMETHASONE DIPROPIONATE 80 UG/1
160 AEROSOL, METERED NASAL DAILY
Qty: 8.7 G | Refills: 2 | Status: SHIPPED | OUTPATIENT
Start: 2022-09-27 | End: 2022-10-27

## 2022-09-27 NOTE — PROCEDURES
Laryngoscopy    Date/Time: 9/27/2022 10:30 AM  Performed by: Richar West MD  Authorized by: Richar West MD     Consent Done?:  Yes (Verbal)  Anesthesia:     Local anesthetic:  Lidocaine 4% and Donald-Synephrine 1/2%    Patient tolerance:  Patient tolerated the procedure well with no immediate complications  Laryngoscopy:     Areas examined:  Nasopharynx, oropharynx, hypopharynx, larynx, vocal cords and nasal cavities    Laryngoscope size:  4 mm  Nose Intranasal:      Mucosa no polyps     No mucosa lesions present     No septum gross deformity     Turbinates not enlarged  Nasopharynx:      No mucosa lesions     Adenoids not present     Posterior choanae patent     Eustachian tube patent  Larynx/hypopharynx:      No epiglottis lesions     No epiglottis edema     No AE folds lesions     No vocal cord polyps     Equal and normal bilateral     No hypopharynx lesions     No piriform sinus pooling     No piriform sinus lesions     Post cricoid edema     No post cricoid erythema     Moderate MT erythema  No purulence  Mild nonpurulent serous PND  Mild postcricoid edema

## 2022-09-27 NOTE — LETTER
2022    Dayo Jameson MD  [address] [place] [address]           OTOLARYNGOLOGY AND COMMUNICATION SCIENCES    Hugo Mark MD, FACS          SURGICAL AND MEDICAL DISEASES OF HEAD AND NECK  MD Hugo Medrano MD, FACS  Rupesh Austin III, MD    OTOLOGY, NEUROTOLOGY and SKULL-BASE SURGERY  Jose Miguel Lerner MD, FACS  Jere Vela MD, Director    PEDIATRIC OTOLARYNGOLOGY & OTOLOGY  NICKOLAS Johnson MD, FAAP  James Clarke MD, FACS    FACIAL PLASTIC and RECONSTRUCTIVE SURGERY  BONILLA Cotton III, MD, FACS    RHINOLOGY and SINUS SURGERY  Richar West MD, MPH, FACS  Director   BONILLA Cotton III, MD, FACS    LARYNGOLOGY  George Christianson MD    SPEECH-LANGUAGE PATHOLOGY  Valorie Marsh, PhD, [facility]-SLP  Roseanne Hilton, MS, CCC-SLP  Julia Bowers, MS, CCC-SLP  Rain Hall MA, [facility]-SLP    AUDIOLOGY SECTION  Christina Bell, MS, CCC-A  CLOVIS Chirinos, CCC-A  Bonifacio Soriano, CCC-A  Bonifacio Duenas, CCC-A  Alexandr Jones Jr., CLOVIS, CCA-A  CLOVIS Godinez, CCC-A  Bonifacio Sood, CCC-A    [facility]  Head and Neck Surgical Oncology  MITCHEL Kong, FNP-C  Pedatric Otolaryngology  MITCHEL Linn, PNP-C       Re:  Wendi Edmonds  :  1945    Dear Dr. Jameson,    Thank you for referring your patient, Wendi Edmonds, to us for evaluation and treatment.  I have enclosed a copy of my clinic note for your review and records.  If you have any questions please do not hesitate to contact our office.     Thank you for allowing me to participate in the care of your patient.    Sincerely,        Richar West MD, MPH, FACS    Director, Rhinology and Sinus Surgery  Department of Otorhinolaryngology  [facility]    Encl:  Progress note     [facility] [address] [place] [address]  phone [phone]  fax [phone]   [email or web address]

## 2022-09-27 NOTE — PATIENT INSTRUCTIONS
"Consider reducing the amount of caffeine you drink.  This will help the acid reflux.  Try decaffeinated drinks, particularly coffee and soda.  If you make your own coffee, try one scoop of decaf with one scoop of regular to make a "half-caf" drink.    Use the new nasal spray (Qnasl) instead of the fluticasone (Flonase).  You can still use the azelastine spray with it.  "

## 2022-09-27 NOTE — PROGRESS NOTES
Subjective:      Wendi Edmonds is a 76 y.o. female who was referred to me by Dr. Dayo Jameson in consultation for post-nasal drip.    She relates a long history for many years of perennial, moderately severe nasal symptoms including congestion, postnasal drip, facial pressure, hyposmia, occasional aural fullness (localized to ITF sign) and fatigue.  She has a known allergy to cats and previously has had allergy shots for the same but she continues to own cats at home.  She is managed for years with fluticasone, azelastine, saline and montelukast.  She has previously used Xyzal and states that she is currently on antibiotics for a sinus problem.    Current sinonasal medications as above.  The last course of antibiotics was  currently though unsure the name .  She does not regularly use nasal decongestant sprays.    She recalls previously having allergy testing, which was reportedly positive for cats.    She denies a history of asthma.    She relates a history of reflux symptoms which is currently managed with pepcid.  She has not previously had an EGD.  She does drink caffeinated beverages on a daily basis consisting of coffee.    She relates a diagnosis of obstructive sleep apnea without regular CPAP use.     She has not had sinonasal surgery.  She has had a tonsillectomy.    She does not recall a prior history of nasal trauma other than the sinonasal surgery.    SNOT-22 score: : (P) 36  NOSE score:: (P) 55%  ETDQ-7 score:: (P) 1.4      Past Medical History  She has a past medical history of Allergic rhinitis, Anxiety, Arthritis, HTN (hypertension), Hypothyroidism, Migraines, GEORGE (obstructive sleep apnea), Osteoarthritis, and Scoliosis.    Past Surgical History  She has a past surgical history that includes Back surgery; Total knee arthroplasty; Foot surgery; Hysterectomy; and Nasal septum surgery.    Family History  Her family history is not on file.    Social History  She reports that she has never  smoked. She has been exposed to tobacco smoke. She has never used smokeless tobacco.    Allergies  She has No Known Allergies.    Medications   She has a current medication list which includes the following prescription(s): albuterol, alendronate, amitriptyline, amoxicillin-clavulanate 875-125mg, azelastine, budesonide, celecoxib, donepezil, escitalopram oxalate, famotidine, fluticasone propionate, gabapentin, irbesartan, irbesartan-hydrochlorothiazide, levocetirizine, levothyroxine, liothyronine, montelukast, naproxen, naproxen sodium, pantoprazole, pravastatin, ranitidine, terbinafine hcl, and tramadol.    Review of Systems     Constitutional: Negative for appetite change, chills, fatigue, fever and unexpected weight loss.      HENT: Negative for ear discharge, ear infection, ear pain, facial swelling, hearing loss, mouth sores, nosebleeds, postnasal drip, ringing in the ears, runny nose, sinus infection, sinus pressure, sore throat, stuffy nose, tonsil infection, dental problems, trouble swallowing and voice change.      Eyes:  Negative for change in eyesight, eye drainage, eye itching and photophobia.     Respiratory:  Negative for cough, shortness of breath, sleep apnea, snoring and wheezing.      Cardiovascular:  Negative for chest pain, foot swelling, irregular heartbeat and swollen veins.     Gastrointestinal:  Negative for abdominal pain, acid reflux, constipation, diarrhea, heartburn and vomiting.     Genitourinary: Negative for difficulty urinating, sexual problems and frequent urination.     Musc: Negative for aching joints, aching muscles, back pain and neck pain.     Skin: Negative for rash.     Allergy: Negative for food allergies and seasonal allergies.     Endocrine: Negative for cold intolerance and heat intolerance.      Neurological: Negative for dizziness, headaches, light-headedness, seizures and tremors.      Hematologic: Negative for bruises/bleeds easily and swollen glands.      Psychiatric:  "Negative for decreased concentration, depression, nervous/anxious and sleep disturbance.             Objective:     Ht 5' 6" (1.676 m)   Wt 71.8 kg (158 lb 4.6 oz)   BMI 25.55 kg/m²        Constitutional:   She appears well-developed. She is cooperative. Normal speech.  No hoarse voice.      Head:  Normocephalic. Salivary glands normal.  Facial strength is normal.      Ears:    Right Ear: No drainage or tenderness. Tympanic membrane is not perforated. Tympanic membrane mobility is normal. No middle ear effusion. No decreased hearing is noted.   Left Ear: No drainage or tenderness. Tympanic membrane is not perforated. Tympanic membrane mobility is normal.  No middle ear effusion. No decreased hearing is noted.     Nose:  No mucosal edema, rhinorrhea, septal deviation or polyps. No epistaxis. Turbinates normal, no turbinate masses and no turbinate hypertrophy.  Right sinus exhibits no maxillary sinus tenderness and no frontal sinus tenderness. Left sinus exhibits no maxillary sinus tenderness and no frontal sinus tenderness.     Mouth/Throat  Oropharynx clear and moist without lesions or asymmetry and normal uvula midline. She does not have dentures. Normal dentition. No oral lesions or mucous membrane lesions. No oropharyngeal exudate or posterior oropharyngeal erythema. Tonsils not present.  Mirror exam not performed due to patient tolerance.  Mirror exam not performed due to patient tolerance.      Neck:  Neck normal without thyromegaly masses, asymmetry, normal tracheal structure, crepitus, and tenderness, thyroid normal, trachea normal and no adenopathy. Normal range of motion present.     She has no cervical adenopathy.     Cardiovascular:    Regular rhythm.              Pulmonary/Chest:   Effort normal.     Psychiatric:   She has a normal mood and affect. Her speech is normal and behavior is normal.     Neurological:   No cranial nerve deficit.     Skin:   No rash noted.     Procedure    Flexible laryngoscopy " performed.  See procedure note.     Left nasal valve     Left MT     Left ET     Right nasal valve     Right MT     Right choana     Right ET and PND     Postcricoid edema      Data Reviewed    WBC (Thousand/uL)   Date Value   05/01/2014 6.9     Eosinophil % (%)   Date Value   05/01/2014 2.8     Eos # (cells/uL)   Date Value   05/01/2014 193     Platelets (Thousand/uL)   Date Value   05/01/2014 193     Glucose (mg/dL)   Date Value   05/01/2014 92     No results found for: IGE    No sinus imaging available.       Assessment:     1. Perennial allergic rhinitis with seasonal variation    2. Postnasal drip    3. Laryngopharyngeal reflux (LPR)         Plan:     I had a long discussion with the patient regarding her condition and the further workup and management options.  I prescribed Qnasl to capitalize on the aerosol property which permits deposition in the posterior nasal cavity, which has not been adequately reached by linear nasal sprays such as Flonase.  This may be used instead of flonase and in conjunction with azelastine.  We discussed that the resumption of allergy shots or SLIT may be the most useful addition to her current therapy.  I counseled her on the benefit of reducing her caffeine intake.    Follow up if symptoms worsen or fail to improve.

## 2022-10-05 ENCOUNTER — TELEPHONE (OUTPATIENT)
Dept: OTOLARYNGOLOGY | Facility: CLINIC | Age: 77
End: 2022-10-05
Payer: MEDICARE

## 2022-10-05 NOTE — TELEPHONE ENCOUNTER
----- Message from Jose R Hemphill sent at 10/5/2022 10:15 AM CDT -----  Contact: [phone]  Type:  RX Refill Request    Who Called: pt     Refill or New Rx:    RX Name and Strength:beclomethasone dipropionate (QNASL) 80 mcg/actuation        [facility]/pharmacy #[id number] - [place], [place] - [address] [place] [id number] [facility] [address]  Phone: [phone] Fax: [phone]        Local or Mail Order:local    Would the patient rather a call back or a response via [facility]?     Best Call Back Number [phone]      Additional Information: pt went to [facility] and was told that the rx wasn't there --- pt is calling to have it resent

## 2022-10-05 NOTE — TELEPHONE ENCOUNTER
Called [facility] and they did not receive the RX that was sent in September. I called in a verbal for the Qnasl to [facility]. Left voicemail letting the patient know.

## 2022-10-20 ENCOUNTER — OFFICE VISIT (OUTPATIENT)
Dept: NEUROLOGY | Facility: CLINIC | Age: 77
End: 2022-10-20
Payer: COMMERCIAL

## 2022-10-20 DIAGNOSIS — R41.3 MEMORY DIFFICULTIES: Primary | ICD-10-CM

## 2022-10-20 PROCEDURE — 96116 NUBHVL XM PHYS/QHP 1ST HR: CPT | Mod: FQ,95,, | Performed by: CLINICAL NEUROPSYCHOLOGIST

## 2022-10-20 PROCEDURE — 96116 PR NEUROBEHAVIORAL STATUS EXAM BY PSYCH/PHYS: ICD-10-PCS | Mod: FQ,95,, | Performed by: CLINICAL NEUROPSYCHOLOGIST

## 2022-10-20 PROCEDURE — 99499 NO LOS: ICD-10-PCS | Mod: 95,,, | Performed by: CLINICAL NEUROPSYCHOLOGIST

## 2022-10-20 PROCEDURE — 99499 UNLISTED E&M SERVICE: CPT | Mod: 95,,, | Performed by: CLINICAL NEUROPSYCHOLOGIST
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
## 2022-10-25 ENCOUNTER — OFFICE VISIT (OUTPATIENT)
Dept: NEUROLOGY | Facility: CLINIC | Age: 77
End: 2022-10-25
Payer: COMMERCIAL

## 2022-10-25 DIAGNOSIS — R45.3 APATHY: ICD-10-CM

## 2022-10-25 DIAGNOSIS — R41.3 OTHER AMNESIA: ICD-10-CM

## 2022-10-25 DIAGNOSIS — G31.84 MILD COGNITIVE IMPAIRMENT: Primary | ICD-10-CM

## 2022-10-25 DIAGNOSIS — G31.84 MILD COGNITIVE IMPAIRMENT WITH MEMORY LOSS: ICD-10-CM

## 2022-10-25 PROCEDURE — 96138 PSYCL/NRPSYC TECH 1ST: CPT | Mod: S$GLB,,, | Performed by: CLINICAL NEUROPSYCHOLOGIST

## 2022-10-25 PROCEDURE — 96139 PSYCL/NRPSYC TST TECH EA: CPT | Mod: S$GLB,,, | Performed by: CLINICAL NEUROPSYCHOLOGIST

## 2022-10-25 PROCEDURE — 96132 PR NEUROPSYCHOLOGIC TEST EVAL SVCS, 1ST HR: ICD-10-PCS | Mod: S$GLB,,, | Performed by: CLINICAL NEUROPSYCHOLOGIST

## 2022-10-25 PROCEDURE — 99999 PR PBB SHADOW E&M-EST. PATIENT-LVL II: ICD-10-PCS | Mod: PBBFAC,,, | Performed by: CLINICAL NEUROPSYCHOLOGIST

## 2022-10-25 PROCEDURE — 96138 PR PSYCH/NEUROPSYCH TEST ADMIN/SCORING, BY TECH, 2+ TESTS, 1ST 30 MIN: ICD-10-PCS | Mod: S$GLB,,, | Performed by: CLINICAL NEUROPSYCHOLOGIST

## 2022-10-25 PROCEDURE — 96133 PR NEUROPSYCHOLOGIC TEST EVAL SVCS, EA ADDTL HR: ICD-10-PCS | Mod: S$GLB,,, | Performed by: CLINICAL NEUROPSYCHOLOGIST

## 2022-10-25 PROCEDURE — 99499 NO LOS: ICD-10-PCS | Mod: S$GLB,,, | Performed by: CLINICAL NEUROPSYCHOLOGIST

## 2022-10-25 PROCEDURE — 99999 PR PBB SHADOW E&M-EST. PATIENT-LVL II: CPT | Mod: PBBFAC,,, | Performed by: CLINICAL NEUROPSYCHOLOGIST

## 2022-10-25 PROCEDURE — 99499 UNLISTED E&M SERVICE: CPT | Mod: S$GLB,,, | Performed by: CLINICAL NEUROPSYCHOLOGIST

## 2022-10-25 PROCEDURE — 96132 NRPSYC TST EVAL PHYS/QHP 1ST: CPT | Mod: S$GLB,,, | Performed by: CLINICAL NEUROPSYCHOLOGIST

## 2022-10-25 PROCEDURE — 96133 NRPSYC TST EVAL PHYS/QHP EA: CPT | Mod: S$GLB,,, | Performed by: CLINICAL NEUROPSYCHOLOGIST

## 2022-10-25 PROCEDURE — 96139 PR PSYCH/NEUROPSYCH TEST ADMIN/SCORING, BY TECH, 2+ TESTS, EA ADDTL 30 MIN: ICD-10-PCS | Mod: S$GLB,,, | Performed by: CLINICAL NEUROPSYCHOLOGIST
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
## 2022-10-27 ENCOUNTER — TELEPHONE (OUTPATIENT)
Dept: NEUROLOGY | Facility: CLINIC | Age: 77
End: 2022-10-27
Payer: MEDICARE

## 2022-10-28 ENCOUNTER — TELEPHONE (OUTPATIENT)
Dept: NEUROLOGY | Facility: CLINIC | Age: 77
End: 2022-10-28
Payer: MEDICARE

## 2022-10-28 NOTE — TELEPHONE ENCOUNTER
----- Message from Jesica Stokes sent at 10/28/2022 10:22 AM CDT -----  Regarding: Pt looking for f/u appt  Hi there!     This pt has an appt with us to go over results on 11/09 she would like to see Dr. Abdi after that to talk about the results and further care.     Can you please call her asap!?    #[phone]

## 2022-10-31 ENCOUNTER — OFFICE VISIT (OUTPATIENT)
Dept: NEUROLOGY | Facility: CLINIC | Age: 77
End: 2022-10-31
Payer: COMMERCIAL

## 2022-10-31 ENCOUNTER — PATIENT MESSAGE (OUTPATIENT)
Dept: NEUROLOGY | Facility: CLINIC | Age: 77
End: 2022-10-31
Payer: MEDICARE

## 2022-10-31 VITALS
SYSTOLIC BLOOD PRESSURE: 181 MMHG | DIASTOLIC BLOOD PRESSURE: 84 MMHG | HEART RATE: 63 BPM | WEIGHT: 158.75 LBS | BODY MASS INDEX: 25.62 KG/M2

## 2022-10-31 DIAGNOSIS — G31.84 MILD COGNITIVE IMPAIRMENT: Primary | ICD-10-CM

## 2022-10-31 PROCEDURE — 1126F PR PAIN SEVERITY QUANTIFIED, NO PAIN PRESENT: ICD-10-PCS | Mod: CPTII,S$GLB,, | Performed by: PSYCHIATRY & NEUROLOGY

## 2022-10-31 PROCEDURE — 3077F SYST BP >= 140 MM HG: CPT | Mod: CPTII,S$GLB,, | Performed by: PSYCHIATRY & NEUROLOGY

## 2022-10-31 PROCEDURE — 1101F PT FALLS ASSESS-DOCD LE1/YR: CPT | Mod: CPTII,S$GLB,, | Performed by: PSYCHIATRY & NEUROLOGY

## 2022-10-31 PROCEDURE — 99214 PR OFFICE/OUTPT VISIT, EST, LEVL IV, 30-39 MIN: ICD-10-PCS | Mod: S$GLB,,, | Performed by: PSYCHIATRY & NEUROLOGY

## 2022-10-31 PROCEDURE — 1160F RVW MEDS BY RX/DR IN RCRD: CPT | Mod: CPTII,S$GLB,, | Performed by: PSYCHIATRY & NEUROLOGY

## 2022-10-31 PROCEDURE — 3079F DIAST BP 80-89 MM HG: CPT | Mod: CPTII,S$GLB,, | Performed by: PSYCHIATRY & NEUROLOGY

## 2022-10-31 PROCEDURE — 1101F PR PT FALLS ASSESS DOC 0-1 FALLS W/OUT INJ PAST YR: ICD-10-PCS | Mod: CPTII,S$GLB,, | Performed by: PSYCHIATRY & NEUROLOGY

## 2022-10-31 PROCEDURE — 1126F AMNT PAIN NOTED NONE PRSNT: CPT | Mod: CPTII,S$GLB,, | Performed by: PSYCHIATRY & NEUROLOGY

## 2022-10-31 PROCEDURE — 3077F PR MOST RECENT SYSTOLIC BLOOD PRESSURE >= 140 MM HG: ICD-10-PCS | Mod: CPTII,S$GLB,, | Performed by: PSYCHIATRY & NEUROLOGY

## 2022-10-31 PROCEDURE — 99214 OFFICE O/P EST MOD 30 MIN: CPT | Mod: S$GLB,,, | Performed by: PSYCHIATRY & NEUROLOGY

## 2022-10-31 PROCEDURE — 99999 PR PBB SHADOW E&M-EST. PATIENT-LVL IV: CPT | Mod: PBBFAC,,, | Performed by: PSYCHIATRY & NEUROLOGY

## 2022-10-31 PROCEDURE — 1159F PR MEDICATION LIST DOCUMENTED IN MEDICAL RECORD: ICD-10-PCS | Mod: CPTII,S$GLB,, | Performed by: PSYCHIATRY & NEUROLOGY

## 2022-10-31 PROCEDURE — 3288F FALL RISK ASSESSMENT DOCD: CPT | Mod: CPTII,S$GLB,, | Performed by: PSYCHIATRY & NEUROLOGY

## 2022-10-31 PROCEDURE — 1160F PR REVIEW ALL MEDS BY PRESCRIBER/CLIN PHARMACIST DOCUMENTED: ICD-10-PCS | Mod: CPTII,S$GLB,, | Performed by: PSYCHIATRY & NEUROLOGY

## 2022-10-31 PROCEDURE — 1159F MED LIST DOCD IN RCRD: CPT | Mod: CPTII,S$GLB,, | Performed by: PSYCHIATRY & NEUROLOGY

## 2022-10-31 PROCEDURE — 99999 PR PBB SHADOW E&M-EST. PATIENT-LVL IV: ICD-10-PCS | Mod: PBBFAC,,, | Performed by: PSYCHIATRY & NEUROLOGY

## 2022-10-31 PROCEDURE — 3079F PR MOST RECENT DIASTOLIC BLOOD PRESSURE 80-89 MM HG: ICD-10-PCS | Mod: CPTII,S$GLB,, | Performed by: PSYCHIATRY & NEUROLOGY

## 2022-10-31 PROCEDURE — 3288F PR FALLS RISK ASSESSMENT DOCUMENTED: ICD-10-PCS | Mod: CPTII,S$GLB,, | Performed by: PSYCHIATRY & NEUROLOGY

## 2022-10-31 RX ORDER — MEMANTINE HYDROCHLORIDE 10 MG/1
TABLET ORAL
Qty: 180 TABLET | Refills: 3 | Status: SHIPPED | OUTPATIENT
Start: 2022-10-31 | End: 2024-02-05 | Stop reason: SDUPTHER

## 2022-10-31 NOTE — PROGRESS NOTES
[facility] - NEUROLOGY 7TH [place] [facility], [place]    Date: October 31, 2022   Patient Name: Wendi Edmonds   MRN: [id number]   PCP: Tatianna Muñoz  Referring Provider: No ref. provider found    Assessment:      This is Wendi Edmonds, 76 y.o. female with vascular risk factors, L CEA, subclinical L BG infarct with MCI by neuropsychology 10/2022.     Plan:      -  Start namenda  -  Start ASA 81mg/d       Greater than 30 minutes spent in chart review, documentation, independent review of imaging, and face to face time with patient    I discussed side effects of the medications. I asked the patient to  stop the medication if She notices serious adverse effects as we discussed and to seek immediate medical attention at an ER.     David Castelan MD  [facility]   Department of Neurology    Subjective:        HPI:   Ms. Wendi Edmonds is a 76 y.o. female who presents with a chief complaint of memory    -  Presents back with daughter, continued memory difficulty but remains independent  -  Compliant with CPAP, sleeps on wedge d/t GEORGE as well  -  Estimates CEA 5-6 years ago with stenosis noted incidentally on work up of neck mass but had several TIA consisting of aphasia in 1990's    PAST MEDICAL HISTORY:  Past Medical History:   Diagnosis Date    Allergic rhinitis     Anxiety 1/19/2014    Arthritis 1/19/2014    HTN (hypertension) 1/19/2014    Hypothyroidism 1/19/2014    Migraines 1/19/2014    GEORGE (obstructive sleep apnea)     Osteoarthritis     Scoliosis        PAST SURGICAL HISTORY:  Past Surgical History:   Procedure Laterality Date    BACK SURGERY      FOOT SURGERY      HYSTERECTOMY      NASAL SEPTUM SURGERY      TOTAL KNEE ARTHROPLASTY         CURRENT MEDS:  Current Outpatient Medications   Medication Sig Dispense Refill    albuterol (PROVENTIL/VENTOLIN HFA) 90 mcg/actuation inhaler Inhale 2 puffs into the lungs every 4 (four) hours as needed.      alendronate  (FOSAMAX) 70 MG tablet       amoxicillin-clavulanate 875-125mg (AUGMENTIN) 875-125 mg per tablet Take 1 tablet by mouth 2 (two) times daily. 20 tablet 0    azelastine (ASTELIN) 137 mcg (0.1 %) nasal spray 2 sprays by Nasal route.      budesonide (PULMICORT) 0.5 mg/2 mL nebulizer solution mix ONE respule WITH 8oz distilled WATER & salt PACKET. irrigate nose WITH ONE-HALF cup of mixture IN EACH NOSTRIL TWICE DAILY      celecoxib (CELEBREX) 200 MG capsule Take 1 capsule by mouth.      famotidine (PEPCID) 20 MG tablet Take 20 mg by mouth.      irbesartan (AVAPRO) 300 MG tablet TAKE 1 TABLET BY MOUTH EVERY DAY 90 tablet 0    irbesartan-hydrochlorothiazide (AVALIDE) 300-12.5 mg per tablet       levocetirizine (XYZAL) 5 MG tablet Take 1 tablet by mouth.      levothyroxine (SYNTHROID) 112 MCG tablet       liothyronine (CYTOMEL) 5 MCG Tab       memantine (NAMENDA) 10 MG Tab Take 1 tab at bed time for 2 weeks then take 1 tab twice daily 180 tablet 3    montelukast (SINGULAIR) 10 mg tablet       naproxen (NAPROSYN) 500 MG tablet       NAPROXEN SODIUM (ALEVE ORAL) Take by mouth.      pantoprazole (PROTONIX) 40 MG tablet Take 1 tablet by mouth.      pravastatin (PRAVACHOL) 40 MG tablet       terbinafine HCl (LAMISIL) 250 mg tablet Take 1 tablet (250 mg total) by mouth once daily. 1 pill by mouth for fungal toenails.  Avoid alcohol intake while taking medication 30 tablet 1    tramadol (ULTRAM) 50 mg tablet        No current facility-administered medications for this visit.       ALLERGIES:  Review of patient's allergies indicates:  No Known Allergies    FAMILY HISTORY:  No family history on file.    SOCIAL HISTORY:  Social History     Tobacco Use    Smoking status: Never     Passive exposure: Past    Smokeless tobacco: Never       Review of Systems:  12 review of systems is negative except for the symptoms mentioned in HPI.        Objective:     Vitals:    10/31/22 1333   BP: (!) 181/84   Pulse: 63   Weight: 72 kg (158 lb 11.7  oz)       General: NAD, well nourished   Eyes: no tearing, discharge, no erythema   ENT: moist mucous membranes of the oral cavity, nares patent    Neck: Supple, full range of motion  Cardiovascular: Warm and well perfused, pulses equal and symmetrical  Lungs: Normal work of breathing, normal chest wall excursions  Skin: No rash, lesions, or breakdown on exposed skin  Psychiatry: Mood and affect are appropriate   Abdomen: soft, non tender, non distended  Extremeties: No cyanosis, clubbing or edema.    Neurological   MENTAL STATUS: Alert and oriented to person, place, and time. Provides own history in good detail   CRANIAL NERVES: Visual fields intact. PERRL. EOMI. Facial sensation intact. Face symmetrical. Hearing grossly intact. Full shoulder shrug bilaterally. Tongue protrudes midline   SENSORY: Sensation is intact to light touch throughout.     MOTOR: Normal bulk and tone. No pronator drift.    CEREBELLAR/COORDINATION/GAIT: Gait steady with normal arm swing and stride length.

## 2022-10-31 NOTE — PATIENT INSTRUCTIONS
MANAGING VASCULAR RISK FACTORS  A personal history of disorders that affect the cardiovascular system (e.g., hypertension, high cholesterol, diabetes, heart disease) can have a negative impact on brain functioning especially over many years. Therefore, it is very important for this patient to maintain good control over his/her risk factors. The following is recommended:  Take all medications as prescribed and follow-up with recommendations above.  Get regular physical exercise to the extent that it is possible. Family may need to structure this into their loved one's day or week and develop a transportation plan.  Eat a well-balanced diet and following the MIND diet (see handout) has been shown to be most brain protective.   Check your blood pressure, cholesterol levels, blood sugar, and others as appropriate.    PRACTICE GOOD COGNITIVE HYGIENE  Engage in regular exercise, which increases alertness and arousal and can improve attention and focus.  Consider lower impact exercises, such as yoga or light walking. Try to exercise for at least 150 minutes per week  Get a good night's sleep, as this can enhance alertness and cognition.  Eat healthy foods and balanced meals. It is notable that research indicates certain nutrients may aid in brain function, such as B vitamins (especially B6, B12, and folic acid), antioxidants (such as vitamins C and E, and beta carotene), and Omega-3 fatty acids. Here are some common tips for diet (Adopted from [place] et al, [place], 2018):  Eat primarily plant-based foods, such as fruits and vegetables, whole grains, legumes   (beans) and nuts.  Limit refined carbohydrates (white pasta, bread, rice).  Replace butter with healthy fats such as olive oil.  Use herbs and spices instead of salt to flavor foods.  Limit red meat and processed meats to no more than a few times a month.  Avoid sugary sodas, bakery goods, and sweets.  Eat fish and poultry at least twice a week.  Keep your brain  active. Find activities to stay mentally active, such as reading, games (cards, checkers), puzzles (crosswords, Sudoku, jig saw), crafts (models, woodworking), gardening, or participating in activities in the community.  Stay socially engaged. Continue staying active with your family and friends.    RESOURCE  Consider purchasing the book, High-Octane Brain: 5 Science-Based Steps to Sharpen Your Memory and Reduce Your Risk of Alzheimer's by Dr. Carlyn Narayanan.    COGNITIVE TIPS AND STRATEGIES  The following tips and strategies are provided to help assist in daily activities:      Attention: Remember that inattention and lack of focus are major culprits to forgetting information so be sure and practice paying attention for adequate learning of information. If you rely on passive attention to remembering something (e.g., yeah, uh-huh approach), you'll find you cannot recall it later. I recommend the following to improve attention, which may aid in later recall:  1. Reduce distractions in the area as much as possible  2. Look at the person as they are speaking to you.   3. Paraphrase as they are speaking  4. Write down important pieces of information   5. Ask them to repeat if you zone out.  6. Have them simplify and reduce information that you need to attend to during conversation.  7. Have visual cues to remind you if you need to do something later.     Processing Speed:  1. Using multiple modalities (e.g., listening, writing notes, asking questions, recording) to learn new information is likely to allow additional time for processing, thus improving memory for the material.   2. Allowing sufficient time to complete tasks will reduce frustration and help to ensure completion.     Executive Functionin. Don't attempt to multi-task.  Separate tasks so that each can be completed one at a time  2. Consider using a calendar/day planner, as that may be effective to help you plan and stay on track.  Color-coding  specific tasks by importance may add additional benefit to your planner  3. Break down large projects into smaller tasks and write down the steps to completing the task.  Taking notes while reading can help with recall.     Storing Information: Use the below strategies to help you further enhance how information is stored  1. Rehearse - Immediately after seeing/hearing something, try to recall it.  Wait a few minutes, then check again.  Gradually lengthen the intervals between rehearsals.  2. Repetition of learned material is critical to ensure storage of information to be learned. Self-test at home to ensure learning.  3. Write down important information to improve your attention and focus and to have something to look back on when you need to recall it.  4. Make sure the person doesn't rattle off, but presents in a clear, logical, and unhurried manner.      Recalling Information:  1. Jog your memory - Lose something?  Think back to when you last had it.  What did you do next?  And after that?  Mentally walk yourself through each activity that followed.  Prodding your memory this way may enable you to recall the location of the missing item.  2. Use a cue - Symbolic reminders (the proverbial string around the finger) are helpful.  So too are memos, timers, calendar notes, etc.--keep them in visible, appropriate place  3. Get organized - Have fixed locations for all important papers, key phone numbers, medications, keys, wallet, glasses, tools, etc.  4. Develop routines - Routines can anchor memories so they do not drift away.       Word Finding:   Not being able to find a word when you need it is a common and very annoying problem. It is not strictly a memory issue, but more a filing and retrieval issue. You know the word or name you want, but it cannot be found in your brain file. It is like having all the files in your file cabinet emptied on the floor. Every piece of information is there but finding it can be  a challenge.   One strategy that may help is working with a speech pathologist/therapist to learn techniques to decrease word finding problems. Some of those strategies/techniques include the following:  Use circumlocutions. Describe the object you're trying to name instead of naming it.  Recite you're A, B, Cs. Go through the alphabet to see if a letter triggers finding the word.  Picture it. Try to visualize the spelling or writing of the word.  Relax. The harder you try to force yourself to come up with the word, the more frustrated you get and the worse you function.   Write it down. In situations where using correct words is critical, such as communicating on the radio while flying, write down the key words so that they are in front of you if needed.  Use word association. For words or names you continually misfile and can't find, try to come up with a word association, something that reminds you of the word or name.  Write a script. Try scripting something important that you want to say. Either write it out or practice it beforehand, so that you get it right.  Play word games. Doing crossword puzzles and playing word finding games may help your brain become more efficient at filing and retrieving words.     BRAIN TRAINING APPS  · BrainHQ (https://www.brain71lbs.Par8o/) - BrainHQ has more than two dozen brain-training exercises organized into six categories: Attention, Brain Speed, Memory, People Skills, Intelligence, and Navigation. It allows you to fit brain exercises into your busy life, and access brain training on most internet-connected devices. Plus, each exercise continuously adapts to your unique performance. So you train at the right level for you.     · Mind Games (https://www.mindgames.com/) - Mind Games is a great collection of games based in part on principles derived from cognitive tasks to help you practice different mental skills. This includes a handful of free games. Additionally, there are a  number of trial games included that can be played 3 times. All games include your score history and a graph of your progress. Using some principles of standardized testing, your scores are also converted to a standard scale so that you can see where you need work and excel. The training center does the work for you by picking the perfect mix of exercises to keep you engaged.     · Elevate (https://Lumus.com/) - Elevate was selected by [employer] as Mirella of the Year! Elevate is a brain training program designed to improve focus, speaking abilities, processing speed, memory, math skills, and more. Each person is provided with a personalized training program that adjusts over time to maximize results. Theoretically, the more you train with Elevate, the more you'll improve critical cognitive skills that are proven to boost productivity, earning power, and self-confidence. Users who train at least 3 times per week have reported dramatic gains and increased confidence. In-mirella purchases.     · Peak (https://www.[place]/) - Reach Peak performance with over 40 unique games, each one developed by neuroscientists and game experts to challenge your cognitive skills and push you further. Use , the  for your brain, to find the right workout for you at the right time. Choose from 's best recommendations to push your skills to the max. Or take contextual workouts like Coffee Break if you're short on time.  will help you track your progress using in-depth insights and keep you going when you need it most. Play for free or upgrade to Pro and get the best brain training experience available. In-mirella purchases.     OTHER SUGGESTIONS  Games and Apps like Sudoku; Crossword Puzzles; Word Find; Memory Games; Logic Games; Solitaire; and Hidden Object Mysteries. Find some you like and play them. It will exercise many of the skills necessary to improve function.      APATHY    When we describe someone as having  high levels of apathy, we typically mean that this individual has difficulty with initiation. In other words, the individual has trouble getting started on something, or getting going. They may need a bit of a push to help them get started. Here are some examples of apathy:  Difficulty with initiating activity or in learning new things  Low activity levels  Low motivation  Lack of interest in socializing  Lack of concern for issues that would have been important to you in the past  Lack of effort or reduced productivity  Not completing tasks that you have started  Needing someone else to remind you or prompt you to do things  Apathy can have several negative consequences, including reduced community and social participation, daily functioning, cognitive activity, treatment compliance, and general health. It can also increase stress and burden in spouses and caregivers.   For this reason, it is important that you try your best to stay ACTIVE! We can help!        A How-To Guide for Taking Back your Get-Up-And-Go!  Try to think of activities in which you would like to start engaging OR activities in which you are already engaging but would like to start engaging in more frequently. It may help to consider various areas of your life that could use improvement.  Examples:   Are their relationships (e.g., with family, friends, or your spouse) that you would like to improve?   Is there something you have been wanting to learn about?  Are there hobbies or organizations you could get involved in?   Would you like to increase your physical activity?  Could you benefit from learning how to better manage emotional difficulties (e.g., depression, anxiety)?  Are there any household projects needing to be started or completed?  Are there any fun projects needing to be started or completed?    Select 3 activities to target over the next 6 weeks. Make sure your chosen goals are not too difficult- you don't want to be  discouraged! Also, make sure they are not too easy- you also don't want to be bored!    For each activity, make your goal [id number]!  S = Specific (e.g., My physical exercise will consist of walking)   M = Measurable (e.g., I will walk 10 blocks, 3 times per week) OR   (e.g., I will walk for 30 minutes, 3 times per week)  A = Attainable (i.e., Is 10 blocks something you can attain?)  R = Realistic (i.e., Is walking 10 blocks 3 times per week realistic for you?)  T = Timely (e.g., I will do my walking exercise on Mondays, Wednesdays, and Fridays at 8am)  Get out your calendar! Plan and organize these activities into your weekly schedule! Research shows that identifying a day and time for engaging in your goals will make it much more likely that you will actually achieve your goals.    Once you have entered your activity goals into your calendar, ask yourself two questions:    On which day and at what time will you start implementing this goal?  What will you do if something interferes with your plan? For example, what will you do if you are not feeling well the day you are planning to engage in the planned activity? (A good answer: Schedule it for the next day! J)    Finally, give yourself a reward for achieving your goals!     Sometimes it helps to have someone remind you of your scheduled activities. You may choose to set an alarm on your phone or in your home to her you to remember. If you are good with computers, you may want to consider using a reminder service, such as iPing (www.iping.com), an automated reminder phone call service that will send you a reminder call at times that you indicate.     Finally, get out and achieve these goals AND reward yourself for your accomplishments, one task at a time.

## 2022-11-09 ENCOUNTER — OFFICE VISIT (OUTPATIENT)
Dept: NEUROLOGY | Facility: CLINIC | Age: 77
End: 2022-11-09
Payer: COMMERCIAL

## 2022-11-09 ENCOUNTER — TELEPHONE (OUTPATIENT)
Dept: NEUROLOGY | Facility: CLINIC | Age: 77
End: 2022-11-09
Payer: MEDICARE

## 2022-11-09 DIAGNOSIS — G31.84 MILD COGNITIVE IMPAIRMENT: Primary | ICD-10-CM

## 2022-11-09 PROCEDURE — 99499 UNLISTED E&M SERVICE: CPT | Mod: S$GLB,,, | Performed by: CLINICAL NEUROPSYCHOLOGIST

## 2022-11-09 PROCEDURE — 99499 NO LOS: ICD-10-PCS | Mod: S$GLB,,, | Performed by: CLINICAL NEUROPSYCHOLOGIST

## 2022-11-09 NOTE — PROGRESS NOTES
NEUROPSYCHOLOGICAL EVALUATION FEEDBACK    Wendi Edmonds attended a feedback session today and was accompanied by her daughter.  We discussed the results of the neuropsychological evaluation and I gave time to discuss questions and concerns. For full evaluation details, please see the note from this provider dated 10/25/2022. A copy of the report was provided via [employer] but two copies were also printed for her today.  45 minutes.     Problem List Items Addressed This Visit          Neuro    Mild cognitive impairment - Primary         Johnna Arias, PhD  Licensed Clinical Neuropsychologist  [facility] - Department of Neurology

## 2022-11-09 NOTE — TELEPHONE ENCOUNTER
Contacted pt, no answer. Left VM requesting fax number to the PT center that she would like to switch to.

## 2022-12-01 ENCOUNTER — CLINICAL SUPPORT (OUTPATIENT)
Dept: REHABILITATION | Facility: HOSPITAL | Age: 77
End: 2022-12-01
Payer: MEDICARE

## 2022-12-01 DIAGNOSIS — Z91.81 AT RISK FOR FALLS: ICD-10-CM

## 2022-12-01 PROCEDURE — 97161 PT EVAL LOW COMPLEX 20 MIN: CPT

## 2022-12-01 NOTE — PLAN OF CARE
[facility]   Physical Therapy Initial Evaluation     Date: 12/1/2022   Name: Wendi [facility] Number: [id number]    Therapy Diagnosis:   Encounter Diagnosis   Name Primary?    At risk for falls      Physician: Dayo Jameson MD    Physician Orders: Aquatic Therapy   Medical Diagnosis from Referral: At risk for falls  Evaluation Date: 12/1/2022  Authorization Period Expiration: 9/13/23  Plan of Care Expiration: 2/1/23  Visit # / Visits authorized: 1/ 1     Precautions: Standard and Fall    Time In: 10:40 am   Time Out: 11:20 am  Total Appointment Time (timed & untimed codes): 50 minutes      SUBJECTIVE   Date of onset: chronic    History of current condition - Wendi reports: she has chronic arthritis which has led to pain in her back, knees, feet and hands. She has a history of scoliosis, lumbar surgeries and TKA. She stated she is left with residual foot drop on the right from waiting to have her back surgery.  She has an AFO but does not wear because she feels it bothers her foot. She is in the process of getting a new one made. She stated her balance is not good and feels like she has near falls frequently but able to catch herself.     Falls: none recently    Prior Therapy: she has had pool therapy in the past  Social History: lives alone in a 2 story house on second story apartment and has no difficulty with stairs.   Occupation: retired  Current Level of Function: Difficulty with walking and balance. She stated she has trouble regaining the balance.     Pain in R foot and arthritis grossly in back, knees, hands and shoulder  Current 5/10, worst 6/10, best 2/10   Location: feet, knees, back and hands  Description: arthrits  Aggravating Factors: prolonged walking, prolonged standing  Easing Factors: rest and sit down     Patients goals: to improve balance     Medical History:   Past Medical History:   Diagnosis Date    Allergic rhinitis     Anxiety 1/19/2014    Arthritis  "2014    HTN (hypertension) 2014    Hypothyroidism 2014    Migraines 2014    GEORGE (obstructive sleep apnea)     Osteoarthritis     Scoliosis        Surgical History:   Wendi Edmonds  has a past surgical history that includes Back surgery; Total knee arthroplasty; Foot surgery; Hysterectomy; and Nasal septum surgery.    Medications:   Wendi has a current medication list which includes the following prescription(s): albuterol, alendronate, amoxicillin-clavulanate 875-125mg, azelastine, budesonide, celecoxib, famotidine, irbesartan, irbesartan-hydrochlorothiazide, levocetirizine, levothyroxine, liothyronine, memantine, montelukast, naproxen, naproxen sodium, pantoprazole, pravastatin, terbinafine hcl, and tramadol.    Allergies:   Review of patient's allergies indicates:  No Known Allergies     Pool contraindications, including but not limited to, incontinence, seizures, fever/GI issues were reviewed with the patient. Patient agrees that based on their knowledge and medical history, they are appropriate for Aquatic Therapy.     OBJECTIVE   TU seconds without use of AD.    5 Times Sit to Stand: 16 seconds without using hands for support     Gait: The patient ambulates with R LE in significant ER with R foot drop noted, moderate antalgic gait and lateral trunk lean with forward flexed posture noted.     Lower Extremity Strength  Right LE  Left LE    Knee extension: 4-/5 Knee extension: 4-/5   Knee flexion: 4-/5 Knee flexion: 4-/5   Hip flexion: 4-/5 Hip flexion: 4-/5   Hip extension:  3-/5 Hip extension: 3-/5   Hip abduction: 4-/5 Hip abduction: 4-/5   Ankle dorsiflexion: 3-/5 Ankle dorsiflexion: 5/5   Ankle plantarflexion: 4-/5 Ankle plantarflexion: 4+/5       Flexibility Testing:      Hamstring: R = Mild restriction      L = Mild restrcition      Balance:    DL feet together EO: 30" with mild sway  DL feet together EC: 30" with minimal sway    SLS R: 2 seconds prior to use of hand for " support  SLS L 5 seconds prior to use of hand for support.     PATIENT EDUCATION AND HOME EXERCISES     Education provided:   - Diagnosis, prognosis, relevant anatomy, role of therapy    ASSESSMENT     Wendi is a 76 y.o. female referred to outpatient Physical Therapy with a medical diagnosis of fall risk. Patient presents with decreased B LE strength and balance deficits and ambulates with gait deviations.  This has impacted the patient's ability to perform ADLs and preferred activities at prior level of function.     Patient prognosis is Good.     Patient will benefit from skilled outpatient Physical Therapy to address the deficits stated above and in the chart below, provide patient /family education, and to maximize patientt's level of independence.     Plan of care discussed with patient: Yes    Patient's spiritual, cultural and educational needs considered and patient is agreeable to the plan of care and goals as stated below:     Anticipated Barriers for therapy: None    Medical Necessity is demonstrated by the following  History  Co-morbidities and personal factors that may impact the plan of care Co-morbidities:   advanced age, anxiety, and HTN    Personal Factors:   no deficits     low   Examination  Body Structures and Functions, activity limitations and participation restrictions that may impact the plan of care Body Regions:   back  lower extremities  upper extremities    Body Systems:    strength  balance  gait    Participation Restrictions:   none    Activity limitations:   Learning and applying knowledge  no deficits    General Tasks and Commands  no deficits    Communication  no deficits    Mobility  lifting and carrying objects  fine hand use (grasping/picking up)  walking    Self care  no deficits    Domestic Life  cooking  doing house work (cleaning house, washing dishes, laundry)    Interactions/Relationships  no deficits    Life Areas  no deficits    Community and Social Life  no deficits          low   Clinical Presentation stable and uncomplicated low   Decision Making/ Complexity Score: low       Goals:  Short Term Goals: 6 weeks   1. Patient will be independent in HEP & progressions.  2. Patient will achieve TUG score of 10 sec to demonstrate improved mobility  3. The patient will achieve 5 sit to stand score of 14 seconds to demonstrate improved transfers and endurance.     Long Term Goals: 12 weeks   1. The patient will demonstrate independence with extensive HEP.  2. Patient will achieve 5 sit to stand score of 12 seconds to demonstrate improved transfers & endurance.  3. Patent is able to demonstrate MMT 4+/5 B knees and hips without pain reports during testing.    4. The patient is able to demonstrate SLS at least 10 seconds without use of hands for support.     PLAN   Plan of care Certification: 12/1/2022 to 2/1/23.    Outpatient Physical Therapy 1-2 times weekly for 12 weeks to include the following interventions: manual therapy, aquatic therapy, patient education, therapeutic exercise, therapeutic activities.    Patient may be seen by PTA as part of rehabilitation team.    Kristin Yao, PT      I CERTIFY THE NEED FOR THESE SERVICES FURNISHED UNDER THIS PLAN OF TREATMENT AND WHILE UNDER MY CARE   Physician's comments:     Physician's Signature: ___________________________________________________

## 2022-12-07 ENCOUNTER — CLINICAL SUPPORT (OUTPATIENT)
Dept: REHABILITATION | Facility: HOSPITAL | Age: 77
End: 2022-12-07
Payer: MEDICARE

## 2022-12-07 DIAGNOSIS — Z91.81 AT RISK FOR FALLS: Primary | ICD-10-CM

## 2022-12-07 PROCEDURE — 97113 AQUATIC THERAPY/EXERCISES: CPT

## 2022-12-07 NOTE — PROGRESS NOTES
[facility] OUTPATIENT THERAPY AND WELLNESS   Physical Therapy Treatment Note     Name: Wendi Edmonds  Clinic Number: [id number]    Therapy Diagnosis:   Encounter Diagnosis   Name Primary?    At risk for falls Yes     Physician: No ref. provider found    Visit Date: 12/7/2022    Physician Orders: Aquatic Therapy   Medical Diagnosis from Referral: At risk for falls  Evaluation Date: 12/1/2022  Authorization Period Expiration: 9/13/23  Plan of Care Expiration: 2/1/23  Visit # / Visits authorized: 2/1      Precautions: Standard and Fall    PTA Visit #: 0/5     Time In: 11:30 am   Time Out: 12:25 pm   Total Billable Time: 30 minutes    SUBJECTIVE     Pt reports: she is feeling good today.    She was compliant with home exercise program.    Response to previous treatment: first pool visit  Functional change: initiated aquatic PT    Pain: 2/10      OBJECTIVE     Objective Measures updated at progress report unless specified.     Treatment     Wendi received aquatic therapeutic exercises to develop strength, endurance, ROM, flexibility, posture, and core stabilization for 55 minutes including:    FUNCTIONAL MOBILITY TRAINING x 2 laps each at beginning and 1 lap each at end of session  Walk forward/backward/lateral    STRETCHES 2 x 30sec  Hamstring     LE EX x 20  Squat  Heel raise with gluteal set  Hip abduction/adduction  Hip flex/ext  LAQ     UE EX/CORE  x 20  Shoulder flex/ext TA activation paddles open  Shoulder horizontal abd/add TA activation paddles open  Shoulder abd/add    Mini squat with push/pull red kickboard    ENDURANCE  Bicycle in // bars 5'      Patient Education and Home Exercises     Home Exercises Provided and Patient Education Provided     Education provided:   Role of aquatic therapy  Hydration post therapy    ASSESSMENT     Patient reported that her legs felt heavy upon completion of the treatment. She was able to perform UE exercises with back close to but away from wall.     Wendi Is progressing  well towards her goals.     Pt prognosis is Good.     Pt will continue to benefit from skilled outpatient physical therapy to address the deficits listed in the problem list box on initial evaluation, provide pt/family education and to maximize pt's level of independence in the home and community environment.     Pt's spiritual, cultural and educational needs considered and pt agreeable to plan of care and goals.     Anticipated barriers to physical therapy: none at this time    Goals:    Short Term Goals: 6 weeks   1. Patient will be independent in HEP & progressions.  2. Patient will achieve TUG score of 10 sec to demonstrate improved mobility  3. The patient will achieve 5 sit to stand score of 14 seconds to demonstrate improved transfers and endurance.      Long Term Goals: 12 weeks   1. The patient will demonstrate independence with extensive HEP.  2. Patient will achieve 5 sit to stand score of 12 seconds to demonstrate improved transfers & endurance.  3. Patent is able to demonstrate MMT 4+/5 B knees and hips without pain reports during testing.    4. The patient is able to demonstrate SLS at least 10 seconds without use of hands for support.     PLAN   Plan of care Certification: 12/1/2022 to 2/1/23.     Outpatient Physical Therapy 1-2 times weekly for 12 weeks to include the following interventions: manual therapy, aquatic therapy, patient education, therapeutic exercise, therapeutic activities.     Patient may be seen by PTA as part of rehabilitation team.    Kristin Yao, PT

## 2022-12-08 ENCOUNTER — CLINICAL SUPPORT (OUTPATIENT)
Dept: REHABILITATION | Facility: HOSPITAL | Age: 77
End: 2022-12-08
Payer: MEDICARE

## 2022-12-08 DIAGNOSIS — Z91.81 AT RISK FOR FALLS: Primary | ICD-10-CM

## 2022-12-08 PROCEDURE — 97113 AQUATIC THERAPY/EXERCISES: CPT

## 2022-12-08 NOTE — PROGRESS NOTES
[facility]   Physical Therapy Treatment Note     Name: Wendi Edmonds  Clinic Number: [id number]    Therapy Diagnosis:   Encounter Diagnosis   Name Primary?    At risk for falls Yes       Physician: No ref. provider found    Visit Date: 12/8/2022    Physician Orders: Aquatic Therapy   Medical Diagnosis from Referral: At risk for falls  Evaluation Date: 12/1/2022  Authorization Period Expiration: 9/13/23  Plan of Care Expiration: 2/1/23  Visit # / Visits authorized: 2/1      Precautions: Standard and Fall    PTA Visit #: 0/5     Time In: 11:20 am   Time Out: 12:20 pm   Total Billable Time: 30 minutes    SUBJECTIVE     Pt reports: she was not sore at all after the treatment yesterday at all.     She was compliant with home exercise program.    Response to previous treatment: first pool visit    Functional change: initiated aquatic PT    Pain: 2/10      OBJECTIVE     Objective Measures updated at progress report unless specified.     Treatment     Wendi received aquatic therapeutic exercises to develop strength, endurance, ROM, flexibility, posture, and core stabilization for 60 minutes including:    FUNCTIONAL MOBILITY TRAINING x 2 laps each at beginning and 1 lap each at end of session  Walk forward/backward/lateral    STRETCHES 2 x 30sec  Hamstring     LE EX x 20  Squat  Heel raise with gluteal set  Hip abduction/adduction  Hip flex/ext  LAQ   HS curl    Sit to stand x 20 reps without use of hands for assist      UE EX/CORE  x 20  Shoulder flex/ext TA activation with closed paddles  Shoulder horizontal abd/add TA activation with closed paddles  Shoulder abd/add with closed paddles    Mini squat with push/pull with blue kickboard    Balance  Walking slow marches x 2 laps  Tandem walking x 2 laps    DL feet together EO--add as tolerated  DL feet together EC--add as tolerated  Tandem balance--add as tolerated    ENDURANCE  Bicycle in // bars 5'    Patient Education and Home Exercises      Home Exercises Provided and Patient Education Provided     Education provided:   Role of aquatic therapy  Hydration post therapy    ASSESSMENT     The patient was able to perform the advanced exercises well today. She required VC to reduce speed of motion with walking marches and required intermittent finger hold for walking balance.     Wendi Is progressing well towards her goals.     Pt prognosis is Good.     Pt will continue to benefit from skilled outpatient physical therapy to address the deficits listed in the problem list box on initial evaluation, provide pt/family education and to maximize pt's level of independence in the home and community environment.     Pt's spiritual, cultural and educational needs considered and pt agreeable to plan of care and goals.     Anticipated barriers to physical therapy: none at this time    Goals:    Short Term Goals: 6 weeks   1. Patient will be independent in HEP & progressions.  2. Patient will achieve TUG score of 10 sec to demonstrate improved mobility  3. The patient will achieve 5 sit to stand score of 14 seconds to demonstrate improved transfers and endurance.      Long Term Goals: 12 weeks   1. The patient will demonstrate independence with extensive HEP.  2. Patient will achieve 5 sit to stand score of 12 seconds to demonstrate improved transfers & endurance.  3. Patent is able to demonstrate MMT 4+/5 B knees and hips without pain reports during testing.    4. The patient is able to demonstrate SLS at least 10 seconds without use of hands for support.     PLAN   Plan of care Certification: 12/1/2022 to 2/1/23.     Outpatient Physical Therapy 1-2 times weekly for 12 weeks to include the following interventions: manual therapy, aquatic therapy, patient education, therapeutic exercise, therapeutic activities.     Patient may be seen by PTA as part of rehabilitation team.    Kristin Yao, PT

## 2022-12-12 ENCOUNTER — CLINICAL SUPPORT (OUTPATIENT)
Dept: REHABILITATION | Facility: HOSPITAL | Age: 77
End: 2022-12-12
Payer: MEDICARE

## 2022-12-12 DIAGNOSIS — Z91.81 AT RISK FOR FALLS: Primary | ICD-10-CM

## 2022-12-12 PROCEDURE — 97113 AQUATIC THERAPY/EXERCISES: CPT

## 2022-12-12 NOTE — PROGRESS NOTES
[facility]   Physical Therapy Treatment Note     Name: Wendi Lamjazmine  [facility] Number: [id number]    Therapy Diagnosis:   Encounter Diagnosis   Name Primary?    At risk for falls Yes       Physician: Dayo Jameson MD    Visit Date: 12/12/2022    Physician Orders: Aquatic Therapy   Medical Diagnosis from Referral: At risk for falls  Evaluation Date: 12/1/2022  Authorization Period Expiration: 9/13/23  Plan of Care Expiration: 2/1/23  Visit # / Visits authorized: 4/1      Precautions: Standard and Fall    PTA Visit #: 0/5     Time In: 10:35  am   Time Out:11:55 am   Total Billable Time: 40 minutes    SUBJECTIVE     Pt reports: she was a little sore in her shoulder after last visit but otherwise feeling good.     She was compliant with home exercise program.    Response to previous treatment: slight soreness    Functional change: initiated aquatic PT    Pain: 2/10      OBJECTIVE     Objective Measures updated at progress report unless specified.     Treatment     Wendi received aquatic therapeutic exercises to develop strength, endurance, ROM, flexibility, posture, and core stabilization for 70 minutes including:    FUNCTIONAL MOBILITY TRAINING x 2 laps each at beginning and 1 lap each at end of session  Walk forward/backward/lateral  STRETCHES 2 x 30sec  Hamstring     LE EX x 25  Squat  Heel raise with gluteal set  Hip abduction/adduction  Hip flex/ext  LAQ   HS curl    Sit to stand x 20 reps without use of hands for assist      UE EX/CORE  x 20  Shoulder flex/ext TA activation with closed paddles  Shoulder horizontal abd/add TA activation with closed paddles  Shoulder abd/add with closed paddles    Mini squat with push/pull with blue kickboard    Balance  Walking slow marches x 2 laps  Tandem walking x 2 laps    DL feet together EO x 1'  DL feet together EC x 1'   Tandem balance x 1' each foot forward     ENDURANCE  Bicycle in // bars 5'    Patient Education and Home Exercises      Home Exercises Provided and Patient Education Provided     Education provided:   Role of aquatic therapy  Hydration post therapy    ASSESSMENT     The patient tolerated the advanced reps for LE exercises well and was able to perform balance activities with minimal sway and intermittent use of hands for support as needed.     Wendi Is progressing well towards her goals.     Pt prognosis is Good.     Pt will continue to benefit from skilled outpatient physical therapy to address the deficits listed in the problem list box on initial evaluation, provide pt/family education and to maximize pt's level of independence in the home and community environment.     Pt's spiritual, cultural and educational needs considered and pt agreeable to plan of care and goals.     Anticipated barriers to physical therapy: none at this time    Goals:    Short Term Goals: 6 weeks   1. Patient will be independent in HEP & progressions.  2. Patient will achieve TUG score of 10 sec to demonstrate improved mobility  3. The patient will achieve 5 sit to stand score of 14 seconds to demonstrate improved transfers and endurance.      Long Term Goals: 12 weeks   1. The patient will demonstrate independence with extensive HEP.  2. Patient will achieve 5 sit to stand score of 12 seconds to demonstrate improved transfers & endurance.  3. Patent is able to demonstrate MMT 4+/5 B knees and hips without pain reports during testing.    4. The patient is able to demonstrate SLS at least 10 seconds without use of hands for support.     PLAN   Plan of care Certification: 12/1/2022 to 2/1/23.     Outpatient Physical Therapy 1-2 times weekly for 12 weeks to include the following interventions: manual therapy, aquatic therapy, patient education, therapeutic exercise, therapeutic activities.     Patient may be seen by PTA as part of rehabilitation team.    Kristin Yao, PT

## 2022-12-15 ENCOUNTER — CLINICAL SUPPORT (OUTPATIENT)
Dept: REHABILITATION | Facility: HOSPITAL | Age: 77
End: 2022-12-15
Payer: MEDICARE

## 2022-12-15 DIAGNOSIS — Z91.81 AT RISK FOR FALLS: Primary | ICD-10-CM

## 2022-12-15 PROCEDURE — 97113 AQUATIC THERAPY/EXERCISES: CPT

## 2022-12-15 NOTE — PROGRESS NOTES
[facility]   Physical Therapy Treatment Note     Name: Wendi Edmonds  Clinic Number: [id number]    Therapy Diagnosis:   Encounter Diagnosis   Name Primary?    At risk for falls Yes       Physician: Dayo Jameson MD    Visit Date: 12/15/2022    Physician Orders: Aquatic Therapy   Medical Diagnosis from Referral: At risk for falls  Evaluation Date: 12/1/2022  Authorization Period Expiration: 9/13/23  Plan of Care Expiration: 2/1/23  Visit # / Visits authorized: 4/1      Precautions: Standard and Fall    PTA Visit #: 0/5     Time In: 10:40 am   Time Out: 11:40 am   Total Billable Time: 60 minutes    SUBJECTIVE     Pt reports: she is a little tired today but attributes that to her allergies.    She was compliant with home exercise program.    Response to previous treatment: slight soreness    Functional change: initiated aquatic PT    Pain: 2/10    OBJECTIVE     Objective Measures updated at progress report unless specified.     Treatment     Wendi received aquatic therapeutic exercises to develop strength, endurance, ROM, flexibility, posture, and core stabilization for 60 minutes including:    FUNCTIONAL MOBILITY TRAINING x 2 laps each at beginning and 1 lap each at end of session  Walk forward/backward/lateral  STRETCHES 2 x 30sec  Hamstring     LE EX x 30  Squat  Heel raise with gluteal set  Hip abduction/adduction  Hip flex/ext  LAQ   HS curl    Sit to stand x 20 reps without use of hands for assist      UE EX/CORE  x 20  Shoulder flex/ext TA activation with closed paddles  Shoulder horizontal abd/add TA activation with closed paddles  Shoulder abd/add with closed paddles    Mini squat with push/pull with blue kickboard    Balance  Walking slow marches x 2 laps  Tandem walking x 2 laps    DL feet together EO x 1'  DL feet together EC x 1'   Tandem balance x 1' each foot forward     ENDURANCE  Bicycle in // bars 5'    Patient Education and Home Exercises     Home Exercises  Provided and Patient Education Provided     Education provided:   Role of aquatic therapy  Hydration post therapy    ASSESSMENT     The patient tolerated the advanced reps for LE exercises without complaints. She did required VC to reduce speed of movement with UE exercises to improve stability and reduce body sway with exercise.     Wendi Is progressing well towards her goals.     Pt prognosis is Good.     Pt will continue to benefit from skilled outpatient physical therapy to address the deficits listed in the problem list box on initial evaluation, provide pt/family education and to maximize pt's level of independence in the home and community environment.     Pt's spiritual, cultural and educational needs considered and pt agreeable to plan of care and goals.     Anticipated barriers to physical therapy: none at this time    Goals:    Short Term Goals: 6 weeks   1. Patient will be independent in HEP & progressions.  2. Patient will achieve TUG score of 10 sec to demonstrate improved mobility  3. The patient will achieve 5 sit to stand score of 14 seconds to demonstrate improved transfers and endurance.      Long Term Goals: 12 weeks   1. The patient will demonstrate independence with extensive HEP.  2. Patient will achieve 5 sit to stand score of 12 seconds to demonstrate improved transfers & endurance.  3. Patent is able to demonstrate MMT 4+/5 B knees and hips without pain reports during testing.    4. The patient is able to demonstrate SLS at least 10 seconds without use of hands for support.     PLAN   Plan of care Certification: 12/1/2022 to 2/1/23.     Outpatient Physical Therapy 1-2 times weekly for 12 weeks to include the following interventions: manual therapy, aquatic therapy, patient education, therapeutic exercise, therapeutic activities.     Patient may be seen by PTA as part of rehabilitation team.    Kristin Yao, PT

## 2022-12-20 ENCOUNTER — CLINICAL SUPPORT (OUTPATIENT)
Dept: REHABILITATION | Facility: HOSPITAL | Age: 77
End: 2022-12-20
Payer: MEDICARE

## 2022-12-20 DIAGNOSIS — Z91.81 AT RISK FOR FALLS: Primary | ICD-10-CM

## 2022-12-20 PROCEDURE — 97113 AQUATIC THERAPY/EXERCISES: CPT

## 2022-12-20 NOTE — PROGRESS NOTES
[facility]   Physical Therapy Treatment Note     Name: Wendi Edmonds  Clinic Number: [id number]    Therapy Diagnosis:   Encounter Diagnosis   Name Primary?    At risk for falls Yes         Physician: Dayo Jameson MD    Visit Date: 12/20/2022    Physician Orders: Aquatic Therapy   Medical Diagnosis from Referral: At risk for falls  Evaluation Date: 12/1/2022  Authorization Period Expiration: 9/13/23  Plan of Care Expiration: 2/1/23  Visit # / Visits authorized: 6/1      Precautions: Standard and Fall    PTA Visit #: 0/5     Time In: 9:00am  Time Out: 10:00 am   Total Billable Time: 60 minutes    SUBJECTIVE     Pt reports: she is feeling fine. Nothing hurting more than usual today. No falls or LOB recently.    She was compliant with home exercise program.    Response to previous treatment: slight soreness    Functional change: initiated aquatic PT    Pain: 2/10    OBJECTIVE     Objective Measures updated at progress report unless specified.     Treatment     Wendi received aquatic therapeutic exercises to develop strength, endurance, ROM, flexibility, posture, and core stabilization for 60 minutes including:    FUNCTIONAL MOBILITY TRAINING x 2 laps each at beginning and 1 lap each at end of session  Walk forward/backward/lateral  STRETCHES 2 x 30sec  Hamstring     LE EX x 30  Squat  Heel raise with gluteal set  Hip abduction/adduction  Hip flex/ext  LAQ   HS curl    Sit to stand x 30 reps without use of hands for assist      UE EX/CORE  x 30  Shoulder flex/ext TA activation with closed paddles  Shoulder horizontal abd/add TA activation with closed paddles  Shoulder abd/add with closed paddles    Mini squat with push/pull with blue kickboard    Balance  Walking slow marches x 2 laps  Tandem walking x 2 laps    DL feet together EO x 1'  DL feet together EC x 1'   Tandem balance x 1' each foot forward     ENDURANCE  Bicycle in // bars 5'    Patient Education and Home Exercises      Home Exercises Provided and Patient Education Provided     Education provided:   Role of aquatic therapy  Hydration post therapy    ASSESSMENT     Pt tolerated increase in reps for UE exercises this session. Moderate prompting and cueing for exercises required today. Pt tolerates balance exercises well, with occasional taps on rail with UE for balance. Will continue to benefit from further LE endurance training and balance exercises to maximize her functional mobility and safety at home and in community.    Wendi Is progressing well towards her goals.     Pt prognosis is Good.     Pt will continue to benefit from skilled outpatient physical therapy to address the deficits listed in the problem list box on initial evaluation, provide pt/family education and to maximize pt's level of independence in the home and community environment.     Pt's spiritual, cultural and educational needs considered and pt agreeable to plan of care and goals.     Anticipated barriers to physical therapy: none at this time    Goals:    Short Term Goals: 6 weeks   1. Patient will be independent in HEP & progressions.  2. Patient will achieve TUG score of 10 sec to demonstrate improved mobility  3. The patient will achieve 5 sit to stand score of 14 seconds to demonstrate improved transfers and endurance.      Long Term Goals: 12 weeks   1. The patient will demonstrate independence with extensive HEP.  2. Patient will achieve 5 sit to stand score of 12 seconds to demonstrate improved transfers & endurance.  3. Patent is able to demonstrate MMT 4+/5 B knees and hips without pain reports during testing.    4. The patient is able to demonstrate SLS at least 10 seconds without use of hands for support.     PLAN   Plan of care Certification: 12/1/2022 to 2/1/23.     Outpatient Physical Therapy 1-2 times weekly for 12 weeks to include the following interventions: manual therapy, aquatic therapy, patient education, therapeutic exercise,  therapeutic activities.     Patient may be seen by PTA as part of rehabilitation team.    Jameel Giang, PT

## 2022-12-28 ENCOUNTER — CLINICAL SUPPORT (OUTPATIENT)
Dept: REHABILITATION | Facility: HOSPITAL | Age: 77
End: 2022-12-28
Payer: MEDICARE

## 2022-12-28 DIAGNOSIS — Z91.81 AT RISK FOR FALLS: Primary | ICD-10-CM

## 2022-12-28 PROCEDURE — 97113 AQUATIC THERAPY/EXERCISES: CPT

## 2022-12-28 NOTE — PROGRESS NOTES
[facility]   Physical Therapy Treatment Note     Name: Wendi Edmonds  [facility] Number: [id number]    Therapy Diagnosis:   Encounter Diagnosis   Name Primary?    At risk for falls Yes     Physician: Dayo Jameson MD    Visit Date: 12/28/2022    Physician Orders: Aquatic Therapy   Medical Diagnosis from Referral: At risk for falls  Evaluation Date: 12/1/2022  Authorization Period Expiration: 9/13/23  Plan of Care Expiration: 2/1/23  Visit # / Visits authorized: 7      Precautions: Standard and Fall    PTA Visit #: 0/5     Time In: 10:30 am  Time Out: 11:35 am   Total Billable Time: 30 minutes     SUBJECTIVE     Pt reports: The patient reports that she has been feeling a little dizzy so she checked her BP which was normal.  She thinks it may be her sinuses.    She was compliant with home exercise program.    Response to previous treatment: slight soreness    Functional change: initiated aquatic PT    Pain: 2/10    OBJECTIVE     Objective Measures updated at progress report unless specified.     Treatment     Wendi received aquatic therapeutic exercises to develop strength, endurance, ROM, flexibility, posture, and core stabilization for 65 minutes including:    FUNCTIONAL MOBILITY TRAINING x 2 laps each at beginning and 1 lap each at end of session  Walk forward/backward/lateral  STRETCHES 2 x 30sec  Hamstring     LE EX x 30 advance to 2.5# ankle weight next   Heel raise into squat  Hip abduction/adduction  Hip flex/ext  LAQ   HS curl    Sit to stand x 30 reps without use of hands for assist    UE EX/CORE  x 30  Shoulder flex/ext TA activation with closed paddles  Shoulder horizontal abd/add TA activation with closed paddles  Shoulder abd/add with closed paddles    Mini squat with push/pull with blue kickboard    Balance  Walking slow marches x 2 laps  Tandem walking x 2 laps    DL feet together EO x 1'  DL feet together EC x 1'   Tandem balance x 1' each foot forward      ENDURANCE  Bicycle in // bars 5'    Patient Education and Home Exercises     Home Exercises Provided and Patient Education Provided     Education provided:   Role of aquatic therapy  Hydration post therapy    ASSESSMENT     Despite some reports of dizziness today she was able to perform all exercises well with some VC required to reduce speed of motion.     Wendi Is progressing well towards her goals.     Pt prognosis is Good.     Pt will continue to benefit from skilled outpatient physical therapy to address the deficits listed in the problem list box on initial evaluation, provide pt/family education and to maximize pt's level of independence in the home and community environment.     Pt's spiritual, cultural and educational needs considered and pt agreeable to plan of care and goals.     Anticipated barriers to physical therapy: none at this time    Goals:    Short Term Goals: 6 weeks   1. Patient will be independent in HEP & progressions.  2. Patient will achieve TUG score of 10 sec to demonstrate improved mobility  3. The patient will achieve 5 sit to stand score of 14 seconds to demonstrate improved transfers and endurance.      Long Term Goals: 12 weeks   1. The patient will demonstrate independence with extensive HEP.  2. Patient will achieve 5 sit to stand score of 12 seconds to demonstrate improved transfers & endurance.  3. Patent is able to demonstrate MMT 4+/5 B knees and hips without pain reports during testing.    4. The patient is able to demonstrate SLS at least 10 seconds without use of hands for support.     PLAN   Plan of care Certification: 12/1/2022 to 2/1/23.     Outpatient Physical Therapy 1-2 times weekly for 12 weeks to include the following interventions: manual therapy, aquatic therapy, patient education, therapeutic exercise, therapeutic activities.     Patient may be seen by PTA as part of rehabilitation team.    Kristin Yao, PT

## 2022-12-29 ENCOUNTER — CLINICAL SUPPORT (OUTPATIENT)
Dept: REHABILITATION | Facility: HOSPITAL | Age: 77
End: 2022-12-29
Payer: MEDICARE

## 2022-12-29 DIAGNOSIS — Z91.81 AT RISK FOR FALLS: Primary | ICD-10-CM

## 2022-12-29 PROCEDURE — 97113 AQUATIC THERAPY/EXERCISES: CPT

## 2022-12-29 NOTE — PROGRESS NOTES
[facility] OUTPATIENT THERAPY AND WELLNESS   Physical Therapy Treatment Note     Name: Wendi Edmonds  Clinic Number: [id number]    Therapy Diagnosis:   Encounter Diagnosis   Name Primary?    At risk for falls Yes       Physician: Dayo Jameson MD    Visit Date: 12/29/2022    Physician Orders: Aquatic Therapy   Medical Diagnosis from Referral: At risk for falls  Evaluation Date: 12/1/2022  Authorization Period Expiration: 9/13/23  Plan of Care Expiration: 2/1/23  Visit # / Visits authorized: 8     Precautions: Standard and Fall    PTA Visit #: 0/5     Time In: 11:30 am  Time Out:  12:25 pm  Total Billable Time: 25 minutes     SUBJECTIVE     Pt reports: The patient reports that she is not dizzy today but feels tired.   She reports she just feels sluggish today.     She was compliant with home exercise program.    Response to previous treatment: slight soreness    Functional change: initiated aquatic PT    Pain: 2/10    OBJECTIVE     Objective Measures updated at progress report unless specified.     Treatment     Wendi received aquatic therapeutic exercises to develop strength, endurance, ROM, flexibility, posture, and core stabilization for 65 minutes including:    FUNCTIONAL MOBILITY TRAINING x 2 laps each at beginning and 1 lap each at end of session  Walk forward/backward/lateral  STRETCHES 2 x 30sec  Hamstring     LE EX x 30 advance to 2.5# ankle weight next   Heel raise into squat  Hip abduction/adduction  Hip flex/ext  LAQ   HS curl    Sit to stand x 30 reps without use of hands for assist    UE EX/CORE  x 30  Shoulder flex/ext TA activation with closed paddles  Shoulder horizontal abd/add TA activation with closed paddles  Shoulder abd/add with closed paddles    Mini squat with push/pull with blue kickboard    Balance  Walking slow marches x 2 laps  Tandem walking x 2 laps    DL feet together EO x 1'  DL feet together EO with head turns x 1'   Tandem balance x 1' each foot forward     ENDURANCE  Bicycle in  // bars 5'    Patient Education and Home Exercises     Home Exercises Provided and Patient Education Provided     Education provided:   Role of aquatic therapy  Hydration post therapy    ASSESSMENT   The patient tolerated the advanced  balance activities well. She initially had some difficulty which required use of hands for assist which improved throughout the duration of the exercise.     Wendi Is progressing well towards her goals.     Pt prognosis is Good.     Pt will continue to benefit from skilled outpatient physical therapy to address the deficits listed in the problem list box on initial evaluation, provide pt/family education and to maximize pt's level of independence in the home and community environment.     Pt's spiritual, cultural and educational needs considered and pt agreeable to plan of care and goals.     Anticipated barriers to physical therapy: none at this time    Goals:    Short Term Goals: 6 weeks   1. Patient will be independent in HEP & progressions.  2. Patient will achieve TUG score of 10 sec to demonstrate improved mobility  3. The patient will achieve 5 sit to stand score of 14 seconds to demonstrate improved transfers and endurance.      Long Term Goals: 12 weeks   1. The patient will demonstrate independence with extensive HEP.  2. Patient will achieve 5 sit to stand score of 12 seconds to demonstrate improved transfers & endurance.  3. Patent is able to demonstrate MMT 4+/5 B knees and hips without pain reports during testing.    4. The patient is able to demonstrate SLS at least 10 seconds without use of hands for support.     PLAN   Plan of care Certification: 12/1/2022 to 2/1/23.     Outpatient Physical Therapy 1-2 times weekly for 12 weeks to include the following interventions: manual therapy, aquatic therapy, patient education, therapeutic exercise, therapeutic activities.     Patient may be seen by PTA as part of rehabilitation team.    Kristin Yao, PT

## 2023-01-04 ENCOUNTER — CLINICAL SUPPORT (OUTPATIENT)
Dept: REHABILITATION | Facility: HOSPITAL | Age: 78
End: 2023-01-04
Payer: MEDICARE

## 2023-01-04 DIAGNOSIS — Z91.81 AT RISK FOR FALLS: Primary | ICD-10-CM

## 2023-01-04 PROCEDURE — 97113 AQUATIC THERAPY/EXERCISES: CPT

## 2023-01-04 NOTE — PROGRESS NOTES
[facility]   Physical Therapy Treatment Note     Name: Wendi Edmonds  [facility] Number: [id number]    Therapy Diagnosis:   Encounter Diagnosis   Name Primary?    At risk for falls Yes       Physician: Dayo Jameson MD    Visit Date: 1/4/2023    Physician Orders: Aquatic Therapy   Medical Diagnosis from Referral: At risk for falls  Evaluation Date: 12/1/2022  Authorization Period Expiration: 9/13/23  Plan of Care Expiration: 2/1/23  Visit # / Visits authorized: 9      Precautions: Standard and Fall    PTA Visit #: 0/5     Time In: 10:25 am  Time Out:11:35 am   Total Billable Time: 40 minutes     SUBJECTIVE     Pt reports: she feels like her balance is still off.     She was compliant with home exercise program.    Response to previous treatment: slight soreness    Functional change: improved balance with aquatic balance activities    Pain: 2/10    OBJECTIVE     Objective Measures updated at progress report unless specified.     Treatment     Wendi received aquatic therapeutic exercises to develop strength, endurance, ROM, flexibility, posture, and core stabilization for 70 minutes including:    FUNCTIONAL MOBILITY TRAINING x 2 laps each at beginning and 1 lap each at end of session  Walk forward/backward/lateral  STRETCHES 2 x 30sec  Hamstring     LE EX x 30 advance to 2.5# ankle weight next   Heel raise into squat  Hip abduction/adduction  Hip flex/ext  LAQ   HS curl    Sit to stand x 30 reps without use of hands for assist    Step up on red step    UE EX/CORE  x 30  Shoulder flex/ext TA activation with multicolor dumbbells  Shoulder horizontal abd/add TA activation with multicolor dumbbells  Shoulder abd/add with multicolor paddles    Mini squat with push/pull with blue kickboard    Balance  Walking slow marches x 2 laps  Tandem walking x 2 laps    DL feet together EO x 1'  DL feet together EO with head turns side to side and up and down x 1' each   Tandem balance x 1' each foot  forward     ENDURANCE  Bicycle in // bars 5'    Patient Education and Home Exercises     Home Exercises Provided and Patient Education Provided     Education provided:   Role of aquatic therapy  Hydration post therapy    ASSESSMENT   The patient demonstrated reduced use of hands for balance activities and reduced sway noted with no LOB with gait or balance activities.     Wendi Is progressing well towards her goals.     Pt prognosis is Good.     Pt will continue to benefit from skilled outpatient physical therapy to address the deficits listed in the problem list box on initial evaluation, provide pt/family education and to maximize pt's level of independence in the home and community environment.     Pt's spiritual, cultural and educational needs considered and pt agreeable to plan of care and goals.     Anticipated barriers to physical therapy: none at this time    Goals:    Short Term Goals: 6 weeks   1. Patient will be independent in HEP & progressions.  2. Patient will achieve TUG score of 10 sec to demonstrate improved mobility  3. The patient will achieve 5 sit to stand score of 14 seconds to demonstrate improved transfers and endurance.      Long Term Goals: 12 weeks   1. The patient will demonstrate independence with extensive HEP.  2. Patient will achieve 5 sit to stand score of 12 seconds to demonstrate improved transfers & endurance.  3. Patent is able to demonstrate MMT 4+/5 B knees and hips without pain reports during testing.    4. The patient is able to demonstrate SLS at least 10 seconds without use of hands for support.     PLAN   Plan of care Certification: 12/1/2022 to 2/1/23.     Outpatient Physical Therapy 1-2 times weekly for 12 weeks to include the following interventions: manual therapy, aquatic therapy, patient education, therapeutic exercise, therapeutic activities.     Patient may be seen by PTA as part of rehabilitation team.    Kristin Yao, PT

## 2023-01-06 ENCOUNTER — CLINICAL SUPPORT (OUTPATIENT)
Dept: REHABILITATION | Facility: HOSPITAL | Age: 78
End: 2023-01-06
Payer: MEDICARE

## 2023-01-06 DIAGNOSIS — Z91.81 AT RISK FOR FALLS: Primary | ICD-10-CM

## 2023-01-06 PROCEDURE — 97113 AQUATIC THERAPY/EXERCISES: CPT

## 2023-01-06 NOTE — PROGRESS NOTES
[facility]   Physical Therapy Treatment Note     Name: Wendi Edmonds  [facility] Number: [id number]    Therapy Diagnosis:   Encounter Diagnosis   Name Primary?    At risk for falls Yes       Physician: Dayo Jameson MD    Visit Date: 1/6/2023    Physician Orders: Aquatic Therapy   Medical Diagnosis from Referral: At risk for falls  Evaluation Date: 12/1/2022  Authorization Period Expiration: 12/29/23  Plan of Care Expiration: 2/1/23  Visit # / Visits authorized: 2/20  10 visits total     Precautions: Standard and Fall    PTA Visit #: 0/5     Time In: 9:35 am  Time Out:10:40 am  Total Billable Time: 30 minutes     SUBJECTIVE     Pt reports: she feels like her balance is actually getting a little better and she is a little stronger.     She was compliant with home exercise program.    Response to previous treatment: slight soreness    Functional change: improved balance with aquatic balance activities    Pain: 2/10    OBJECTIVE     Objective Measures updated at progress report unless specified.     Treatment     Wendi received aquatic therapeutic exercises to develop strength, endurance, ROM, flexibility, posture, and core stabilization for 65 minutes including:    FUNCTIONAL MOBILITY TRAINING x 2 laps each at beginning and 1 lap each at end of session  Walk forward/backward/lateral    STRETCHES 2 x 30sec  Hamstring     LE EX x 20  2.5# ankle weight   Heel raise into squat  Hip abduction/adduction  Hip flex/ext  LAQ   HS curl    Sit to stand x 30 reps without use of hands for assist    Step up on red step    UE EX/CORE  x 30  Shoulder flex/ext TA activation with multicolor dumbbells  Shoulder horizontal abd/add TA activation with multicolor dumbbells  Shoulder abd/add with multicolor paddles    Mini squat with push/pull with blue kickboard    Balance  Walking slow marches x 2 laps  Tandem walking x 2 laps    DL feet together EO x 1'  DL feet together EO with head turns side to side and  up and down x 1' each   Tandem balance x 1' each foot forward     ENDURANCE  Bicycle in // bars 5'    Patient Education and Home Exercises     Home Exercises Provided and Patient Education Provided     Education provided:   Role of aquatic therapy  Hydration post therapy    ASSESSMENT   The patient tolerated the addition of ankle weights well today with ability to perform 20 reps for all LE exercises.     Wendi Is progressing well towards her goals.     Pt prognosis is Good.     Pt will continue to benefit from skilled outpatient physical therapy to address the deficits listed in the problem list box on initial evaluation, provide pt/family education and to maximize pt's level of independence in the home and community environment.     Pt's spiritual, cultural and educational needs considered and pt agreeable to plan of care and goals.     Anticipated barriers to physical therapy: none at this time    Goals:    Short Term Goals: 6 weeks   1. Patient will be independent in HEP & progressions.  2. Patient will achieve TUG score of 10 sec to demonstrate improved mobility  3. The patient will achieve 5 sit to stand score of 14 seconds to demonstrate improved transfers and endurance.      Long Term Goals: 12 weeks   1. The patient will demonstrate independence with extensive HEP.  2. Patient will achieve 5 sit to stand score of 12 seconds to demonstrate improved transfers & endurance.  3. Patent is able to demonstrate MMT 4+/5 B knees and hips without pain reports during testing.    4. The patient is able to demonstrate SLS at least 10 seconds without use of hands for support.     PLAN   Plan of care Certification: 12/1/2022 to 2/1/23.     Outpatient Physical Therapy 1-2 times weekly for 12 weeks to include the following interventions: manual therapy, aquatic therapy, patient education, therapeutic exercise, therapeutic activities.     Patient may be seen by PTA as part of rehabilitation team.    Kristin Yao, PT

## 2023-01-11 ENCOUNTER — CLINICAL SUPPORT (OUTPATIENT)
Dept: REHABILITATION | Facility: HOSPITAL | Age: 78
End: 2023-01-11
Payer: MEDICARE

## 2023-01-11 DIAGNOSIS — Z91.81 AT RISK FOR FALLS: Primary | ICD-10-CM

## 2023-01-11 PROCEDURE — 97113 AQUATIC THERAPY/EXERCISES: CPT

## 2023-01-11 NOTE — PROGRESS NOTES
"  [facility] OUTPATIENT THERAPY AND WELLNESS   Physical Therapy Treatment Note     Name: Wendi Edmonds  Clinic Number: [id number]    Therapy Diagnosis:   Encounter Diagnosis   Name Primary?    At risk for falls Yes     Physician: Dayo Jameson MD    Visit Date: 1/11/2023    Physician Orders: Aquatic Therapy   Medical Diagnosis from Referral: At risk for falls  Evaluation Date: 12/1/2022  Authorization Period Expiration: 12/29/23  Plan of Care Expiration: 2/1/23  Visit # / Visits authorized: 3/20  11 visits total     Precautions: Standard and Fall    PTA Visit #: 0/5     Time In: 9:20 am  Time Out:10:30 am  Total Billable Time: 30 minutes     SUBJECTIVE     Pt reports: she is starting to feel improvement in her stability.     She was compliant with home exercise program.    Response to previous treatment: slight soreness    Functional change: improved balance with aquatic balance activities    Pain: 2/10    OBJECTIVE     Objective Measures updated at progress report unless specified.     Treatment     Wendi received aquatic therapeutic exercises to develop strength, endurance, ROM, flexibility, posture, and core stabilization for 70 minutes including:    FUNCTIONAL MOBILITY TRAINING x 2 laps each at beginning and 1 lap each at end of session  Walk forward/backward/lateral    STRETCHES 2 x 30sec  Hamstring     LE EX x 30  2.5# ankle weight   Heel raise into squat  Hip abduction/adduction  Hip flex/ext  LAQ   HS curl  Standing clam--added today     Sit to stand x 30 reps without use of hands for assist    Step up on 8" red step x 20 reps each     UE EX/CORE  x 30  Shoulder flex/ext TA activation with multicolor dumbbells  Shoulder horizontal abd/add TA activation with multicolor dumbbells  Shoulder abd/add with multicolor paddles    Mini squat with push/pull with blue kickboard    Balance  Walking slow marches x 2 laps  Tandem walking x 2 laps    DL feet together EO x 1'  DL feet together EO with head turns side to " side and up and down x 1' each   Tandem balance x 1' each foot forward     ENDURANCE  Bicycle in // bars 5'    Patient Education and Home Exercises     Home Exercises Provided and Patient Education Provided     Education provided:   Role of aquatic therapy  Hydration post therapy    ASSESSMENT   The patient performed advanced reps with all the exercises today. She required some VC and demo to perform step ups with correct execution. She voiced some LE fatigue upon completion of the treatment.     Wendi Is progressing well towards her goals.     Pt prognosis is Good.     Pt will continue to benefit from skilled outpatient physical therapy to address the deficits listed in the problem list box on initial evaluation, provide pt/family education and to maximize pt's level of independence in the home and community environment.     Pt's spiritual, cultural and educational needs considered and pt agreeable to plan of care and goals.     Anticipated barriers to physical therapy: none at this time    Goals:    Short Term Goals: 6 weeks   1. Patient will be independent in HEP & progressions.  2. Patient will achieve TUG score of 10 sec to demonstrate improved mobility  3. The patient will achieve 5 sit to stand score of 14 seconds to demonstrate improved transfers and endurance.      Long Term Goals: 12 weeks   1. The patient will demonstrate independence with extensive HEP.  2. Patient will achieve 5 sit to stand score of 12 seconds to demonstrate improved transfers & endurance.  3. Patent is able to demonstrate MMT 4+/5 B knees and hips without pain reports during testing.    4. The patient is able to demonstrate SLS at least 10 seconds without use of hands for support.     PLAN   Plan of care Certification: 12/1/2022 to 2/1/23.     Outpatient Physical Therapy 1-2 times weekly for 12 weeks to include the following interventions: manual therapy, aquatic therapy, patient education, therapeutic exercise, therapeutic  activities.     Patient may be seen by PTA as part of rehabilitation team.    Kristin Yao, PT

## 2023-01-13 ENCOUNTER — CLINICAL SUPPORT (OUTPATIENT)
Dept: REHABILITATION | Facility: HOSPITAL | Age: 78
End: 2023-01-13
Payer: MEDICARE

## 2023-01-13 DIAGNOSIS — Z91.81 AT RISK FOR FALLS: Primary | ICD-10-CM

## 2023-01-13 PROCEDURE — 97113 AQUATIC THERAPY/EXERCISES: CPT

## 2023-01-13 NOTE — PROGRESS NOTES
"  [facility]   Physical Therapy Treatment Note     Name: Wendi Edmonds  Clinic Number: [id number]    Therapy Diagnosis:   Encounter Diagnosis   Name Primary?    At risk for falls Yes       Physician: Dayo Jameson MD    Visit Date: 1/13/2023    Physician Orders: Aquatic Therapy   Medical Diagnosis from Referral: At risk for falls  Evaluation Date: 12/1/2022  Authorization Period Expiration: 12/29/23  Plan of Care Expiration: 2/1/23  Visit # / Visits authorized: 4/20  12 visits total     Precautions: Standard and Fall    PTA Visit #: 0/5     Time In: 9:30 am  Time Out:10:50  am  Total Billable Time: 60 minutes     SUBJECTIVE     Pt reports: she is able to walk to and from her car with greater ease.    She was compliant with home exercise program.    Response to previous treatment: slight soreness    Functional change: improved balance with aquatic balance activities    Pain: 2/10    OBJECTIVE     Objective Measures updated at progress report unless specified.     Treatment     Wendi received aquatic therapeutic exercises to develop strength, endurance, ROM, flexibility, posture, and core stabilization for 80 minutes including:    FUNCTIONAL MOBILITY TRAINING x 2 laps each at beginning and 1 lap each at end of session  Walk forward/backward/lateral    STRETCHES 2 x 30sec  Hamstring     LE EX x 30  2.5# ankle weight   Heel raise into squat  Hip abduction/adduction  Hip flex/ext  LAQ   HS curl  Standing clam    Sit to stand x 30 reps without use of hands for assist    Step up on 8" red step x 20 reps each     UE EX/CORE  x 30  Shoulder flex/ext TA activation with multicolor dumbbells  Shoulder horizontal abd/add TA activation with multicolor dumbbells  Shoulder abd/add with multicolor paddles    Mini squat with push/pull with blue kickboard    Balance  Walking slow marches x 2 laps  Tandem walking x 2 laps    DL feet together EO x 1'  DL feet together EO with head turns side to side and " up and down x 1' each   Tandem balance x 1' each foot forward     ENDURANCE  Bicycle in // bars 5'    Patient Education and Home Exercises     Home Exercises Provided and Patient Education Provided     Education provided:   Role of aquatic therapy  Hydration post therapy    ASSESSMENT   The patient tolerated the treatment well today with reduced need for VC for execution of step ups correctly,     Wendi Is progressing well towards her goals.     Pt prognosis is Good.     Pt will continue to benefit from skilled outpatient physical therapy to address the deficits listed in the problem list box on initial evaluation, provide pt/family education and to maximize pt's level of independence in the home and community environment.     Pt's spiritual, cultural and educational needs considered and pt agreeable to plan of care and goals.     Anticipated barriers to physical therapy: none at this time    Goals:    Short Term Goals: 6 weeks   1. Patient will be independent in HEP & progressions.  2. Patient will achieve TUG score of 10 sec to demonstrate improved mobility  3. The patient will achieve 5 sit to stand score of 14 seconds to demonstrate improved transfers and endurance.      Long Term Goals: 12 weeks   1. The patient will demonstrate independence with extensive HEP.  2. Patient will achieve 5 sit to stand score of 12 seconds to demonstrate improved transfers & endurance.  3. Patent is able to demonstrate MMT 4+/5 B knees and hips without pain reports during testing.    4. The patient is able to demonstrate SLS at least 10 seconds without use of hands for support.     PLAN   Plan of care Certification: 12/1/2022 to 2/1/23.     Outpatient Physical Therapy 1-2 times weekly for 12 weeks to include the following interventions: manual therapy, aquatic therapy, patient education, therapeutic exercise, therapeutic activities.     Patient may be seen by PTA as part of rehabilitation team.    Kristin Yao, PT

## 2023-01-18 ENCOUNTER — CLINICAL SUPPORT (OUTPATIENT)
Dept: REHABILITATION | Facility: HOSPITAL | Age: 78
End: 2023-01-18
Payer: MEDICARE

## 2023-01-18 DIAGNOSIS — Z91.81 AT RISK FOR FALLS: Primary | ICD-10-CM

## 2023-01-18 PROCEDURE — 97113 AQUATIC THERAPY/EXERCISES: CPT

## 2023-01-18 NOTE — PROGRESS NOTES
" [facility]   Physical Therapy Treatment Note     Name: Wendi Edmonds  Clinic Number: [id number]    Therapy Diagnosis:   Encounter Diagnosis   Name Primary?    At risk for falls Yes     Physician: Dayo Jameson MD    Visit Date: 1/18/2023    Physician Orders: Aquatic Therapy   Medical Diagnosis from Referral: At risk for falls  Evaluation Date: 12/1/2022  Authorization Period Expiration: 12/29/23  Plan of Care Expiration: 2/1/23  Visit # / Visits authorized: 5/20  13 visits total     Precautions: Standard and Fall    PTA Visit #: 0/5     Time In: 10:30 am  Time Out:11:30 am  Total Billable Time: 30 minutes     SUBJECTIVE     Pt reports: she is feeling tired today.  She stated she stayed up too late playing a game on her tablet.     She was compliant with home exercise program.    Response to previous treatment: slight soreness    Functional change: improved balance with aquatic balance activities    Pain: 2/10    OBJECTIVE     Objective Measures updated at progress report unless specified.     Treatment     Wnedi received aquatic therapeutic exercises to develop strength, endurance, ROM, flexibility, posture, and core stabilization for 60 minutes including:    FUNCTIONAL MOBILITY TRAINING x 2 laps each at beginning and 1 lap each at end of session  Walk forward/backward/lateral    STRETCHES 2 x 30sec  Hamstring     LE EX x 30  2.5# ankle weight   Heel raise into squat  Hip abduction/adduction  Hip flex/ext  LAQ   HS curl  Standing clam    Sit to stand x 30 reps without use of hands for assist    Step up on 8" red step x 25 reps each     UE EX/CORE  x 30  Shoulder flex/ext TA activation with multicolor dumbbells  Shoulder horizontal abd/add TA activation with multicolor dumbbells  Shoulder abd/add with multicolor paddles    Mini squat with push/pull with blue kickboard    Balance  Walking slow marches x 2 laps  Tandem walking x 2 laps    DL feet together EO x 1'  DL feet together EO " with head turns side to side and up and down x 1' each   Tandem balance x 1' each foot forward     ENDURANCE  Bicycle in // bars 5'    Patient Education and Home Exercises     Home Exercises Provided and Patient Education Provided     Education provided:   Role of aquatic therapy  Hydration post therapy    ASSESSMENT   The patient performed all exercises with good execution despite some fatigue today. She is demonstrating improved ability to perform yellow dumbbell exercises with reduced sway noted.     Wendi Is progressing well towards her goals.     Pt prognosis is Good.     Pt will continue to benefit from skilled outpatient physical therapy to address the deficits listed in the problem list box on initial evaluation, provide pt/family education and to maximize pt's level of independence in the home and community environment.     Pt's spiritual, cultural and educational needs considered and pt agreeable to plan of care and goals.     Anticipated barriers to physical therapy: none at this time    Goals:    Short Term Goals: 6 weeks   1. Patient will be independent in HEP & progressions.  2. Patient will achieve TUG score of 10 sec to demonstrate improved mobility  3. The patient will achieve 5 sit to stand score of 14 seconds to demonstrate improved transfers and endurance.      Long Term Goals: 12 weeks   1. The patient will demonstrate independence with extensive HEP.  2. Patient will achieve 5 sit to stand score of 12 seconds to demonstrate improved transfers & endurance.  3. Patent is able to demonstrate MMT 4+/5 B knees and hips without pain reports during testing.    4. The patient is able to demonstrate SLS at least 10 seconds without use of hands for support.     PLAN   Plan of care Certification: 12/1/2022 to 2/1/23.     Outpatient Physical Therapy 1-2 times weekly for 12 weeks to include the following interventions: manual therapy, aquatic therapy, patient education, therapeutic exercise, therapeutic  activities.     Patient may be seen by PTA as part of rehabilitation team.    Kristin Yao, PT

## 2023-01-20 ENCOUNTER — CLINICAL SUPPORT (OUTPATIENT)
Dept: REHABILITATION | Facility: HOSPITAL | Age: 78
End: 2023-01-20
Payer: MEDICARE

## 2023-01-20 DIAGNOSIS — Z91.81 AT RISK FOR FALLS: Primary | ICD-10-CM

## 2023-01-20 PROCEDURE — 97113 AQUATIC THERAPY/EXERCISES: CPT

## 2023-01-20 NOTE — PROGRESS NOTES
" [facility]   Physical Therapy Treatment Note     Name: Wendi Edmonds  Clinic Number: [id number]    Therapy Diagnosis:   Encounter Diagnosis   Name Primary?    At risk for falls Yes     Physician: Dayo Jameson MD    Visit Date: 1/20/2023    Physician Orders: Aquatic Therapy   Medical Diagnosis from Referral: At risk for falls  Evaluation Date: 12/1/2022  Authorization Period Expiration: 12/29/23  Plan of Care Expiration: 2/1/23  Visit # / Visits authorized: 6/20  14 visits total     Precautions: Standard and Fall    PTA Visit #: 0/5     Time In: 9:35 am  Time Out:11:00 am  Total Billable Time: 30 minutes     SUBJECTIVE     Pt reports: she feels like her walking has improved with her endurance and stability while walking.     She was compliant with home exercise program.    Response to previous treatment: slight soreness    Functional change: improved balance with aquatic balance activities    Pain: 2/10    OBJECTIVE     Objective Measures updated at progress report unless specified.     Treatment     Wendi received aquatic therapeutic exercises to develop strength, endurance, ROM, flexibility, posture, and core stabilization for 75 minutes including:    FUNCTIONAL MOBILITY TRAINING x 2 laps each at beginning and 1 lap each at end of session  Walk forward/backward/lateral    STRETCHES 2 x 30sec  Hamstring     LE EX x 30  2.5# ankle weight   Heel raise into squat  Hip abduction/adduction  Hip flex/ext  LAQ   HS curl  Standing clam    Sit to stand x 30 reps without use of hands for assist    Step up on 8" red step x 25 reps each     UE EX/CORE  x 30  Shoulder flex/ext TA activation with multicolor dumbbells  Shoulder horizontal abd/add TA activation with multicolor dumbbells  Shoulder abd/add with multicolor paddles    Mini squat with push/pull with blue kickboard    Balance  Walking slow marches x 2 laps with orange dumbbell  Tandem walking x 2 laps    DL feet together EO x 1'  DL " feet together EO with head turns side to side and up and down x 1' each   Tandem balance x 1' each foot forward     ENDURANCE  Bicycle in // bars 5'    Patient Education and Home Exercises     Home Exercises Provided and Patient Education Provided     Education provided:   Role of aquatic therapy  Hydration post therapy    ASSESSMENT   The patient was able to perform the UE exercises with improved balance and ability to ground her feet without LOB.     Wendi Is progressing well towards her goals.     Pt prognosis is Good.     Pt will continue to benefit from skilled outpatient physical therapy to address the deficits listed in the problem list box on initial evaluation, provide pt/family education and to maximize pt's level of independence in the home and community environment.     Pt's spiritual, cultural and educational needs considered and pt agreeable to plan of care and goals.     Anticipated barriers to physical therapy: none at this time    Goals:    Short Term Goals: 6 weeks   1. Patient will be independent in HEP & progressions.  2. Patient will achieve TUG score of 10 sec to demonstrate improved mobility  3. The patient will achieve 5 sit to stand score of 14 seconds to demonstrate improved transfers and endurance.      Long Term Goals: 12 weeks   1. The patient will demonstrate independence with extensive HEP.  2. Patient will achieve 5 sit to stand score of 12 seconds to demonstrate improved transfers & endurance.  3. Patent is able to demonstrate MMT 4+/5 B knees and hips without pain reports during testing.    4. The patient is able to demonstrate SLS at least 10 seconds without use of hands for support.     PLAN   Plan of care Certification: 12/1/2022 to 2/1/23.     Outpatient Physical Therapy 1-2 times weekly for 12 weeks to include the following interventions: manual therapy, aquatic therapy, patient education, therapeutic exercise, therapeutic activities.     Patient may be seen by PTA as part of  rehabilitation team.    Kristin Yao, PT

## 2023-01-23 ENCOUNTER — CLINICAL SUPPORT (OUTPATIENT)
Dept: REHABILITATION | Facility: HOSPITAL | Age: 78
End: 2023-01-23
Payer: MEDICARE

## 2023-01-23 DIAGNOSIS — Z91.81 AT RISK FOR FALLS: Primary | ICD-10-CM

## 2023-01-23 PROCEDURE — 97113 AQUATIC THERAPY/EXERCISES: CPT

## 2023-01-23 NOTE — PROGRESS NOTES
"    [facility]   Physical Therapy Treatment Note     Name: Wendi Edmonds  Clinic Number: [id number]    Therapy Diagnosis:   Encounter Diagnosis   Name Primary?    At risk for falls Yes     Physician: Dayo Jameson MD    Visit Date: 1/23/2023    Physician Orders: Aquatic Therapy   Medical Diagnosis from Referral: At risk for falls  Evaluation Date: 12/1/2022  Authorization Period Expiration: 12/29/23  Plan of Care Expiration: 2/1/23  Visit # / Visits authorized: 6/20  15 visits total     Precautions: Standard and Fall    PTA Visit #: 0/5     Time In: 10:35 am  Time Out:11:40 am  Total Billable Time: 30 minutes     SUBJECTIVE     Pt reports: she feels pretty good today.     She was compliant with home exercise program.    Response to previous treatment: slight soreness    Functional change: improved balance with aquatic balance activities    Pain: 2/10    OBJECTIVE     Objective Measures updated at progress report unless specified.     Treatment     Wendi received aquatic therapeutic exercises to develop strength, endurance, ROM, flexibility, posture, and core stabilization for 70 minutes including:    FUNCTIONAL MOBILITY TRAINING x 2 laps each at beginning and 1 lap each at end of session  Walk forward/backward/lateral    STRETCHES 2 x 30sec  Hamstring     LE EX x 30  2.5# ankle weight   Heel raise into squat  Hip abduction/adduction  Hip flex/ext  LAQ   HS curl  Standing clam    Sit to stand x 30 reps without use of hands for assist    Step up on 8" red step x 25 reps each     UE EX/CORE  x 30  Shoulder flex/ext TA activation with multicolor dumbbells  Shoulder horizontal abd/add TA activation with multicolor dumbbells  Shoulder abd/add with multicolor paddles    Mini squat with push/pull with blue kickboard    Balance  Walking slow marches x 2 laps with orange dumbbell  Tandem walking x 2 laps    DL feet together EO x 1'  DL feet together EO with head turns side to side and up and " down x 1' each   Tandem balance x 1' each foot forward     ENDURANCE  Bicycle in // bars 5'    Patient Education and Home Exercises     Home Exercises Provided and Patient Education Provided     Education provided:   Role of aquatic therapy  Hydration post therapy    ASSESSMENT   The patient reported her legs felt tired after the session today. She was able to ground herself and avoid moving in the pool for the UE exercises better today that previous visits.      Wendi Is progressing well towards her goals.     Pt prognosis is Good.     Pt will continue to benefit from skilled outpatient physical therapy to address the deficits listed in the problem list box on initial evaluation, provide pt/family education and to maximize pt's level of independence in the home and community environment.     Pt's spiritual, cultural and educational needs considered and pt agreeable to plan of care and goals.     Anticipated barriers to physical therapy: none at this time    Goals:    Short Term Goals: 6 weeks   1. Patient will be independent in HEP & progressions.  2. Patient will achieve TUG score of 10 sec to demonstrate improved mobility  3. The patient will achieve 5 sit to stand score of 14 seconds to demonstrate improved transfers and endurance.      Long Term Goals: 12 weeks   1. The patient will demonstrate independence with extensive HEP.  2. Patient will achieve 5 sit to stand score of 12 seconds to demonstrate improved transfers & endurance.  3. Patent is able to demonstrate MMT 4+/5 B knees and hips without pain reports during testing.    4. The patient is able to demonstrate SLS at least 10 seconds without use of hands for support.     PLAN   Plan of care Certification: 12/1/2022 to 2/1/23.     Outpatient Physical Therapy 1-2 times weekly for 12 weeks to include the following interventions: manual therapy, aquatic therapy, patient education, therapeutic exercise, therapeutic activities.     Patient may be seen by PTA  as part of rehabilitation team.    Kristin Yao, PT

## 2023-01-25 ENCOUNTER — CLINICAL SUPPORT (OUTPATIENT)
Dept: REHABILITATION | Facility: HOSPITAL | Age: 78
End: 2023-01-25
Payer: MEDICARE

## 2023-01-25 DIAGNOSIS — Z91.81 AT RISK FOR FALLS: Primary | ICD-10-CM

## 2023-01-25 PROCEDURE — 97110 THERAPEUTIC EXERCISES: CPT

## 2023-01-25 PROCEDURE — 97113 AQUATIC THERAPY/EXERCISES: CPT | Mod: TE

## 2023-01-25 NOTE — PROGRESS NOTES
" [facility]   Physical Therapy Treatment Note     Name: Wendi Edmonds  Clinic Number: [id number]    Therapy Diagnosis:   Encounter Diagnosis   Name Primary?    At risk for falls Yes       Physician: Dayo Jameson MD    Visit Date: 1/25/2023    Physician Orders: Aquatic Therapy   Medical Diagnosis from Referral: At risk for falls  Evaluation Date: 12/1/2022  Authorization Period Expiration: 12/29/23  Plan of Care Expiration: 2/1/23  Visit # / Visits authorized: 7/20  16 visits total     Precautions: Standard and Fall    PTA Visit #: 0/5     Time In: 10:35 m   Time Out:11:50 am   Total Billable Time: 75 minutes 5 units    SUBJECTIVE     Pt reports: she feels pretty good today.     She was compliant with home exercise program.    Response to previous treatment: slight soreness    Functional change: improved balance with aquatic balance activities    Pain: 2/10    OBJECTIVE     Objective Measures updated at progress report unless specified.     Treatment     Wendi received aquatic therapeutic exercises to develop strength, endurance, ROM, flexibility, posture, and core stabilization for 70 minutes including:    FUNCTIONAL MOBILITY TRAINING x 2 laps each at beginning and 1 lap each at end of session  Walk forward/backward/lateral    STRETCHES 2 x 30sec  Hamstring     LE EX x 30  2.5# ankle weight   Heel raise into squat  Hip abduction/adduction  Hip flex/ext  LAQ   HS curl  Standing clam    Sit to stand x 30 reps without use of hands for assist    Step up on 8" red step x 25 reps each     UE EX/CORE  x 30  Shoulder flex/ext TA activation with multicolor dumbbells  Shoulder horizontal abd/add TA activation with multicolor dumbbells  Shoulder abd/add with multicolor Dumbbells    Mini squat with push/pull with blue kickboard    Balance  Walking slow marches x 2 laps with orange dumbbell  Tandem walking x 2 laps    DL feet together EO x 1'  DL feet together EO with head turns side to side " and up and down x 1' each   Tandem balance x 1' each foot forward     ENDURANCE  Bicycle in // bars 5'    Patient Education and Home Exercises     Home Exercises Provided and Patient Education Provided     Education provided:   Role of aquatic therapy  Hydration post therapy    ASSESSMENT   Patient presents motivated for session. Was able to improve body mechanics during therapeutic interventions with cueing. Patient was able to demonstrate core and LE stability throughout session with decreased swaying. Will continue to benefit from aquatic PT.     Wendi Is progressing well towards her goals.     Pt prognosis is Good.     Pt will continue to benefit from skilled outpatient physical therapy to address the deficits listed in the problem list box on initial evaluation, provide pt/family education and to maximize pt's level of independence in the home and community environment.     Pt's spiritual, cultural and educational needs considered and pt agreeable to plan of care and goals.     Anticipated barriers to physical therapy: none at this time    Goals:    Short Term Goals: 6 weeks   1. Patient will be independent in HEP & progressions.  2. Patient will achieve TUG score of 10 sec to demonstrate improved mobility  3. The patient will achieve 5 sit to stand score of 14 seconds to demonstrate improved transfers and endurance.      Long Term Goals: 12 weeks   1. The patient will demonstrate independence with extensive HEP.  2. Patient will achieve 5 sit to stand score of 12 seconds to demonstrate improved transfers & endurance.  3. Patent is able to demonstrate MMT 4+/5 B knees and hips without pain reports during testing.    4. The patient is able to demonstrate SLS at least 10 seconds without use of hands for support.     PLAN   Plan of care Certification: 12/1/2022 to 2/1/23.     Outpatient Physical Therapy 1-2 times weekly for 12 weeks to include the following interventions: manual therapy, aquatic therapy, patient  education, therapeutic exercise, therapeutic activities.     Patient may be seen by PTA as part of rehabilitation team.    Shaniec Cervantes, PT

## 2023-01-31 ENCOUNTER — CLINICAL SUPPORT (OUTPATIENT)
Dept: REHABILITATION | Facility: HOSPITAL | Age: 78
End: 2023-01-31
Payer: MEDICARE

## 2023-01-31 DIAGNOSIS — Z91.81 AT RISK FOR FALLS: Primary | ICD-10-CM

## 2023-01-31 PROCEDURE — 97113 AQUATIC THERAPY/EXERCISES: CPT

## 2023-01-31 NOTE — PROGRESS NOTES
" [facility]   Physical Therapy Treatment Note     Name: Wendi Edmonds  Clinic Number: [id number]    Therapy Diagnosis:   Encounter Diagnosis   Name Primary?    At risk for falls Yes     Physician: Dayo Jameson MD    Visit Date: 1/31/2023    Physician Orders: Aquatic Therapy   Medical Diagnosis from Referral: At risk for falls  Evaluation Date: 12/1/2022  Authorization Period Expiration: 12/29/23  Plan of Care Expiration: 2/1/23  Visit # / Visits authorized: 7/20  16 visits total     Precautions: Standard and Fall    PTA Visit #: 0/5     Time In: 9:30 am   Time Out:10:50 am   Total Billable Time: 80 minutes  5 units    SUBJECTIVE     Pt reports: she is very tired today and her allergies are bothersome.     She was compliant with home exercise program.    Response to previous treatment: slight soreness    Functional change: improved balance with aquatic balance activities    Pain: 2/10    OBJECTIVE     Objective Measures updated at progress report unless specified.     Treatment     Wendi received aquatic therapeutic exercises to develop strength, endurance, ROM, flexibility, posture, and core stabilization for 80 minutes including:    FUNCTIONAL MOBILITY TRAINING x 2 laps each at beginning and 1 lap each at end of session  Walk forward/backward/lateral    STRETCHES 2 x 30sec  Hamstring     LE EX x 30  2.5# ankle weight   Heel raise into squat  Hip abduction  Hip flex/ext  LAQ   HS curl  Standing clam    Sit to stand x 30 reps without use of hands for assist    Step up on 8" red step x 30 reps each     UE EX/CORE  x 30  Shoulder flex/ext TA activation with multicolor dumbbells  Shoulder horizontal abd/add TA activation with multicolor dumbbells  Shoulder abd/add with multicolor Dumbbells    Mini squat th push/pull with blue kickboard    Balance  Walking slow marches x 2 laps with orange dumbbell  Tandem walking x 2 laps    DL feet together EO x 1'  DL feet together EO with head " turns side to side and up and down x 1' each   Tandem balance x 1' each foot forward     ENDURANCE  Bicycle in // bars 5'    Patient Education and Home Exercises     Home Exercises Provided and Patient Education Provided     Education provided:   Role of aquatic therapy  Hydration post therapy    ASSESSMENT     The patient had some greater difficulty maintaining static tandem balance today and required intermittent use of hands for assist but was able to perform tandem walking in the middle of the pool without use of hands for assist.      Wendi Is progressing well towards her goals.     Pt prognosis is Good.     Pt will continue to benefit from skilled outpatient physical therapy to address the deficits listed in the problem list box on initial evaluation, provide pt/family education and to maximize pt's level of independence in the home and community environment.     Pt's spiritual, cultural and educational needs considered and pt agreeable to plan of care and goals.     Anticipated barriers to physical therapy: none at this time    Goals:    Short Term Goals: 6 weeks   1. Patient will be independent in HEP & progressions.  2. Patient will achieve TUG score of 10 sec to demonstrate improved mobility  3. The patient will achieve 5 sit to stand score of 14 seconds to demonstrate improved transfers and endurance.      Long Term Goals: 12 weeks   1. The patient will demonstrate independence with extensive HEP.  2. Patient will achieve 5 sit to stand score of 12 seconds to demonstrate improved transfers & endurance.  3. Patent is able to demonstrate MMT 4+/5 B knees and hips without pain reports during testing.    4. The patient is able to demonstrate SLS at least 10 seconds without use of hands for support.     PLAN   Plan of care Certification: 12/1/2022 to 2/1/23.     Outpatient Physical Therapy 1-2 times weekly for 12 weeks to include the following interventions: manual therapy, aquatic therapy, patient education,  therapeutic exercise, therapeutic activities.     Patient may be seen by PTA as part of rehabilitation team.    Kristin Yao, PT

## 2023-02-03 ENCOUNTER — CLINICAL SUPPORT (OUTPATIENT)
Dept: REHABILITATION | Facility: HOSPITAL | Age: 78
End: 2023-02-03
Payer: MEDICARE

## 2023-02-03 DIAGNOSIS — Z91.81 AT RISK FOR FALLS: Primary | ICD-10-CM

## 2023-02-03 PROCEDURE — 97113 AQUATIC THERAPY/EXERCISES: CPT

## 2023-02-03 NOTE — PLAN OF CARE
"  [facility]  Physical Therapy Plan of Care Note    Name: Wendi Edmonds  Clinic Number: [id number]    Therapy Diagnosis:   Encounter Diagnosis   Name Primary?    At risk for falls Yes     Physician: Tatianna Muñoz MD    Visit Date: 2/3/2023    Physician Orders: Aquatic Therapy   Medical Diagnosis from Referral: At risk for falls  Evaluation Date: 2022  Authorization Period Expiration: 23  Plan of Care Expiration: 23  Visit # / Visits authorized:   18 visits total     Precautions: Standard and Fall    PTA Visit #: 0/5     Time In: 9:35 am   Time Out:10:50 am  Total Billable Time: 55 minutes     SUBJECTIVE     Update: The patient reports she feels like balance is better and does not feel like she is leaning forward as much as when she started. She stated her back still gets tired but it is better.     Pain: 2/10      OBJECTIVE     Update 2/3/23    TU/3/23  10 seconds without use of AD.  22 11 seconds without use of AD.     5 Times Sit to Stand:   2/3/23  13 seconds without use of hands for support  22 16 seconds without using hands for support      Gait: The patient ambulates with R LE in significant ER with R foot drop noted, moderate antalgic gait and lateral trunk lean with forward flexed posture noted.      Lower Extremity Strength  Right LE   Left LE     Knee extension: 4/5 Knee extension: 4/5   Knee flexion: 4/5 Knee flexion: 4/5   Hip flexion: 4-/5 Hip flexion: 4-/5   Hip extension:  4-/5 Hip extension: 4-/5   Hip abduction: 4-/5 Hip abduction: 4-/5   Ankle dorsiflexion: 3-/5 Ankle dorsiflexion: 5/5   Ankle plantarflexion: 4/5 Ankle plantarflexion: 4+/5                                Balance:     DL feet together EO: 30" with minimal sway  DL feet together EC: 30" with minimal sway     SLS R: 2 seconds prior to use of hand for support  SLS L 5 seconds prior to use of hand for support.     Treatment     Wendi received aquatic therapeutic " "exercises to develop strength, endurance, ROM, flexibility, posture, and core stabilization for 80 minutes including:    FUNCTIONAL MOBILITY TRAINING x 2 laps each at beginning and 1 lap each at end of session  Walk forward/backward/lateral    STRETCHES 2 x 30sec  Hamstring     LE EX x 20  3.75 # ankle weight   Heel raise into squat  Hip abduction  Hip flex/ext  LAQ   HS curl  Standing clam    Sit to stand x 30 reps without use of hands for assist    Step up on 8" red step x 20 reps each     UE EX/CORE  x 30  Shoulder flex/ext TA activation with multicolor dumbbells  Shoulder horizontal abd/add TA activation with multicolor dumbbells  Shoulder abd/add with multicolor Dumbbells    Balance  Walking slow marches x 2 laps with orange dumbbell  Tandem walking x 2 laps    DL feet together EO x 1'  DL feet together EO with head turns side to side and up and down x 1' each   Tandem balance x 1' each foot forward     ENDURANCE  Bicycle in // bars 5'    ASSESSMENT     Update:   The patient has attended 18 PT visit since the start of care. Subjectively she reports functional improvement with improved balance and ability to stand upright longer. Objectively she has improved B LE strength, improved TUG and 5 times sit to stand. She continues with difficulty with SLS and could benefit from additional skilled PT interventions to improve strength, balance and reduce the risk of falls.      Previous Short Term Goals Status:       Short Term Goals: 6 weeks   1. Patient will be independent in HEP & progressions.--Ongoing as of 2/3/23  2. Patient will achieve TUG score of 10 sec to demonstrate improved mobility--Progressing as of 2/3/23  3. The patient will achieve 5 sit to stand score of 14 seconds to demonstrate improved transfers and endurance. --MET as of 2/3/23     Long Term Goals: 12 weeks   1. The patient will demonstrate independence with extensive HEP.  2. Patient will achieve 5 sit to stand score of 12 seconds to demonstrate " improved transfers & endurance.  3. Patent is able to demonstrate MMT 4+/5 B knees and hips without pain reports during testing.    4. The patient is able to demonstrate SLS at least 10 seconds without use of hands for support.     Long Term Goal Status: continue per initial plan of care.    PLAN     Updated Certification Period: 2/3/23 to 3/3/23   Recommended Treatment Plan: 2 times per week for 4 weeks:  Aquatic Therapy      Kristin Yao, PT    I CERTIFY THE NEED FOR THESE SERVICES FURNISHED UNDER THIS PLAN OF TREATMENT AND WHILE UNDER MY CARE  Physician's comments:      Physician's Signature: ___________________________________________________

## 2023-02-03 NOTE — PROGRESS NOTES
"  [facility]  Physical Therapy Plan of Care Note    Name: Wendi Edmonds  Clinic Number: [id number]    Therapy Diagnosis:   Encounter Diagnosis   Name Primary?    At risk for falls Yes     Physician: Tatianna Muñoz MD    Visit Date: 2/3/2023    Physician Orders: Aquatic Therapy   Medical Diagnosis from Referral: At risk for falls  Evaluation Date: 2022  Authorization Period Expiration: 23  Plan of Care Expiration: 23  Visit # / Visits authorized:   18 visits total     Precautions: Standard and Fall    PTA Visit #: 0/5     Time In: 9:35 am   Time Out:10:50 am  Total Billable Time: 55 minutes     SUBJECTIVE     Update: The patient reports she feels like balance is better and does not feel like she is leaning forward as much as when she started. She stated her back still gets tired but it is better.     Pain: 2/10      OBJECTIVE     Update 2/3/23    TU/3/23  10 seconds without use of AD.  22 11 seconds without use of AD.     5 Times Sit to Stand:   2/3/23  13 seconds without use of hands for support  22 16 seconds without using hands for support      Gait: The patient ambulates with R LE in significant ER with R foot drop noted, moderate antalgic gait and lateral trunk lean with forward flexed posture noted.      Lower Extremity Strength  Right LE   Left LE     Knee extension: 4/5 Knee extension: 4/5   Knee flexion: 4/5 Knee flexion: 4/5   Hip flexion: 4-/5 Hip flexion: 4-/5   Hip extension:  4-/5 Hip extension: 4-/5   Hip abduction: 4-/5 Hip abduction: 4-/5   Ankle dorsiflexion: 3-/5 Ankle dorsiflexion: 5/5   Ankle plantarflexion: 4/5 Ankle plantarflexion: 4+/5                                Balance:     DL feet together EO: 30" with minimal sway  DL feet together EC: 30" with minimal sway     SLS R: 2 seconds prior to use of hand for support  SLS L 5 seconds prior to use of hand for support.     Treatment     Wendi received aquatic therapeutic " "exercises to develop strength, endurance, ROM, flexibility, posture, and core stabilization for 80 minutes including:    FUNCTIONAL MOBILITY TRAINING x 2 laps each at beginning and 1 lap each at end of session  Walk forward/backward/lateral    STRETCHES 2 x 30sec  Hamstring     LE EX x 20  3.75 # ankle weight   Heel raise into squat  Hip abduction  Hip flex/ext  LAQ   HS curl  Standing clam    Sit to stand x 30 reps without use of hands for assist    Step up on 8" red step x 20 reps each     UE EX/CORE  x 30  Shoulder flex/ext TA activation with multicolor dumbbells  Shoulder horizontal abd/add TA activation with multicolor dumbbells  Shoulder abd/add with multicolor Dumbbells    Balance  Walking slow marches x 2 laps with orange dumbbell  Tandem walking x 2 laps    DL feet together EO x 1'  DL feet together EO with head turns side to side and up and down x 1' each   Tandem balance x 1' each foot forward     ENDURANCE  Bicycle in // bars 5'    ASSESSMENT     Update:   The patient has attended 18 PT visit since the start of care. Subjectively she reports functional improvement with improved balance and ability to stand upright longer. Objectively she has improved B LE strength, improved TUG and 5 times sit to stand. She continues with difficulty with SLS and could benefit from additional skilled PT interventions to improve strength, balance and reduce the risk of falls.      Previous Short Term Goals Status:       Short Term Goals: 6 weeks   1. Patient will be independent in HEP & progressions.--Ongoing as of 2/3/23  2. Patient will achieve TUG score of 10 sec to demonstrate improved mobility--Progressing as of 2/3/23  3. The patient will achieve 5 sit to stand score of 14 seconds to demonstrate improved transfers and endurance. --MET as of 2/3/23     Long Term Goals: 12 weeks   1. The patient will demonstrate independence with extensive HEP.  2. Patient will achieve 5 sit to stand score of 12 seconds to demonstrate " improved transfers & endurance.  3. Patent is able to demonstrate MMT 4+/5 B knees and hips without pain reports during testing.    4. The patient is able to demonstrate SLS at least 10 seconds without use of hands for support.     Long Term Goal Status: continue per initial plan of care.    PLAN     Updated Certification Period: 2/3/23 to 3/3/23   Recommended Treatment Plan: 2 times per week for 4 weeks:  Aquatic Therapy      Kristin Yao, PT    I CERTIFY THE NEED FOR THESE SERVICES FURNISHED UNDER THIS PLAN OF TREATMENT AND WHILE UNDER MY CARE  Physician's comments:      Physician's Signature: ___________________________________________________

## 2023-02-06 ENCOUNTER — CLINICAL SUPPORT (OUTPATIENT)
Dept: REHABILITATION | Facility: HOSPITAL | Age: 78
End: 2023-02-06
Payer: MEDICARE

## 2023-02-06 DIAGNOSIS — Z91.81 AT RISK FOR FALLS: Primary | ICD-10-CM

## 2023-02-06 PROCEDURE — 97113 AQUATIC THERAPY/EXERCISES: CPT

## 2023-02-06 NOTE — PROGRESS NOTES
"[facility]  Physical Therapy Daily Treatment Note    Name: Wendi Edmonds  Clinic Number: [id number]    Therapy Diagnosis:   Encounter Diagnosis   Name Primary?    At risk for falls Yes       Physician: Tatianna Muñoz MD    Visit Date: 2/6/2023    Physician Orders: Aquatic Therapy   Medical Diagnosis from Referral: At risk for falls  Evaluation Date: 12/1/2022  Authorization Period Expiration: 12/29/23  Plan of Care Expiration: 2/1/23  Visit # / Visits authorized: 9/20  19 visits total     Precautions: Standard and Fall    PTA Visit #: 0/5     Time In: 9:45 am   Time Out: 10:45 am  Total Billable Time: 45 minutes     SUBJECTIVE     The patient reports: her allergies are bothering her and she is very tired today.     Pain: 2/10    OBJECTIVE     Update 2/3/23    Treatment     Wendi received aquatic therapeutic exercises to develop strength, endurance, ROM, flexibility, posture, and core stabilization for 60 minutes including:    FUNCTIONAL MOBILITY TRAINING x 2 laps each at beginning and 1 lap each at end of session  Walk forward/backward/lateral    STRETCHES 2 x 30sec  Hamstring     LE EX x 25  3.75 # ankle weight   Heel raise into squat  Hip abduction  Hip flex/ext  LAQ   HS curl  Standing clam    Sit to stand x 30 reps without use of hands for assist    Step up on 8" red step x 20 reps each     UE EX/CORE  x 30  Shoulder flex/ext TA activation with multicolor dumbbells  Shoulder horizontal abd/add TA activation with multicolor dumbbells  Shoulder abd/add with multicolor Dumbbells    Balance  Walking slow marches x 2 laps with orange dumbbell  Tandem walking x 2 laps    DL feet together EO with head turns side to side and up and down x 1' each   Tandem balance x 1' each foot forward     ENDURANCE  Bicycle in // bars 5'    ASSESSMENT   The patient was generally more fatigued throughout the session today which she attributes to her allergies bothering her. Despite this fatigue level " she was able to perform all exercises and reps. She required some VC to reduce speed and improve height of march with the orange dumbbell.      Previous Short Term Goals Status:       Short Term Goals: 6 weeks   1. Patient will be independent in HEP & progressions.--Ongoing as of 2/3/23  2. Patient will achieve TUG score of 10 sec to demonstrate improved mobility--Progressing as of 2/3/23  3. The patient will achieve 5 sit to stand score of 14 seconds to demonstrate improved transfers and endurance. --MET as of 2/3/23     Long Term Goals: 12 weeks   1. The patient will demonstrate independence with extensive HEP.  2. Patient will achieve 5 sit to stand score of 12 seconds to demonstrate improved transfers & endurance.  3. Patent is able to demonstrate MMT 4+/5 B knees and hips without pain reports during testing.    4. The patient is able to demonstrate SLS at least 10 seconds without use of hands for support.     Long Term Goal Status: continue per initial plan of care.    PLAN     Updated Certification Period: 2/3/23 to 3/3/23   Recommended Treatment Plan: 2 times per week for 4 weeks:  Aquatic Therapy      Kristin Yao, PT

## 2023-02-08 ENCOUNTER — CLINICAL SUPPORT (OUTPATIENT)
Dept: REHABILITATION | Facility: HOSPITAL | Age: 78
End: 2023-02-08
Payer: MEDICARE

## 2023-02-08 DIAGNOSIS — Z91.81 AT RISK FOR FALLS: Primary | ICD-10-CM

## 2023-02-08 PROCEDURE — 97113 AQUATIC THERAPY/EXERCISES: CPT

## 2023-02-08 NOTE — PROGRESS NOTES
"[facility]  Physical Therapy Daily Treatment Note    Name: Wendi Edmonds  Clinic Number: [id number]    Therapy Diagnosis:   No diagnosis found.      Physician: Dayo Jameson MD    Visit Date: 2/8/2023    Physician Orders: Aquatic Therapy   Medical Diagnosis from Referral: At risk for falls  Evaluation Date: 12/1/2022  Authorization Period Expiration: 12/29/23  Plan of Care Expiration: 2/1/23  Visit # / Visits authorized: 9/20  19 visits total     Precautions: Standard and Fall    PTA Visit #: 0/5     Time In: 9:30 am   Time Out: 10:45 am  Total Billable Time: 45  minutes     SUBJECTIVE     The patient reports: she is feeling much better today than Monday.     Pain: 2/10    OBJECTIVE     Update 2/3/23    Treatment     Wendi received aquatic therapeutic exercises to develop strength, endurance, ROM, flexibility, posture, and core stabilization for 75 minutes including:    FUNCTIONAL MOBILITY TRAINING x 2 laps each at beginning and 1 lap each at end of session  Walk forward/backward/lateral    STRETCHES 2 x 30sec  Hamstring     LE EX x 25  3.75 # ankle weight   Heel raise into squat  Hip abduction  Hip flex/ext  LAQ   HS curl  Standing clam    Sit to stand x 30 reps without use of hands for assist    Step up on 8" red step x 20 reps each     UE EX/CORE  x 30  Shoulder flex/ext TA activation with multicolor dumbbells  Shoulder horizontal abd/add TA activation with multicolor dumbbells  Shoulder abd/add with multicolor Dumbbells    Balance  Walking slow marches x 2 laps with orange dumbbell  Tandem walking x 2 laps    DL feet together EO with head turns side to side and up and down x 1' each   Tandem balance x 1' each foot forward  with head turns    ENDURANCE  Bicycle in // bars 5'    ASSESSMENT   The patient was able to perform tandem balance with head turns with good quality and without use of hands for support and no LOB.     Previous Short Term Goals Status:       Short Term Goals: " 6 weeks   1. Patient will be independent in HEP & progressions.--Ongoing as of 2/3/23  2. Patient will achieve TUG score of 10 sec to demonstrate improved mobility--Progressing as of 2/3/23  3. The patient will achieve 5 sit to stand score of 14 seconds to demonstrate improved transfers and endurance. --MET as of 2/3/23     Long Term Goals: 12 weeks   1. The patient will demonstrate independence with extensive HEP.  2. Patient will achieve 5 sit to stand score of 12 seconds to demonstrate improved transfers & endurance.  3. Patent is able to demonstrate MMT 4+/5 B knees and hips without pain reports during testing.    4. The patient is able to demonstrate SLS at least 10 seconds without use of hands for support.     Long Term Goal Status: continue per initial plan of care.    PLAN     Updated Certification Period: 2/3/23 to 3/3/23   Recommended Treatment Plan: 2 times per week for 4 weeks:  Aquatic Therapy      Kristin Yao, PT

## 2023-02-14 ENCOUNTER — CLINICAL SUPPORT (OUTPATIENT)
Dept: REHABILITATION | Facility: HOSPITAL | Age: 78
End: 2023-02-14
Payer: MEDICARE

## 2023-02-14 DIAGNOSIS — Z91.81 AT RISK FOR FALLS: Primary | ICD-10-CM

## 2023-02-14 PROCEDURE — 97113 AQUATIC THERAPY/EXERCISES: CPT | Mod: CQ

## 2023-02-14 NOTE — PROGRESS NOTES
"[facility]  Physical Therapy Daily Treatment Note    Name: Wendi Edmonds  Clinic Number: [id number]    Therapy Diagnosis:   Encounter Diagnosis   Name Primary?    At risk for falls Yes       Physician: Dayo Jameson MD    Visit Date: 2/14/2023    Physician Orders: Aquatic Therapy   Medical Diagnosis from Referral: At risk for falls  Evaluation Date: 12/1/2022  Authorization Period Expiration: 12/29/23  Plan of Care Expiration: 2/1/23  Visit # / Visits authorized: 14/20  21 visits total     Precautions: Standard and Fall    PTA Visit #: 1/5     Time In: 9:30 am   Time Out: 10:40 am  Total Billable Time: 30 minutes     SUBJECTIVE     The patient reports: no changes since last visit pre tx.     Pain: 2/10    OBJECTIVE     Update 2/3/23    Treatment     Wendi received aquatic therapeutic exercises to develop strength, endurance, ROM, flexibility, posture, and core stabilization for 70 minutes including:    FUNCTIONAL MOBILITY TRAINING x 2 laps each at beginning and 1 lap each at end of session  Walk forward/backward/lateral    STRETCHES 2 x 30sec  Hamstring     LE EX x 25  3.75 # ankle weight   Heel raise into squat  Hip abduction  Hip flex/ext  LAQ   HS curl  Standing clam    Sit to stand x 30 reps without use of hands for assist    Step up on 8" red step x 20 reps each     UE EX/CORE  x 30  Shoulder flex/ext TA activation with multicolor dumbbells  Shoulder horizontal abd/add TA activation with multicolor dumbbells  Shoulder abd/add with multicolor Dumbbells     Balance  Walking slow marches x 2 laps with orange dumbbell  Tandem walking x 2 laps    DL feet together EO with head turns side to side and up and down x 1' each   Tandem balance x 1' each foot forward  with head turns    ENDURANCE  Bicycle in // bars 5'    ASSESSMENT     Pt w/ good michelle of instructed exercises today. Pt w/ no c/o increased pain throughout tx. Pt required min verbal and visual cues throughout tx to complete " exercises w/ proper technique.     Previous Short Term Goals Status:       Short Term Goals: 6 weeks   1. Patient will be independent in HEP & progressions.--Ongoing as of 2/3/23  2. Patient will achieve TUG score of 10 sec to demonstrate improved mobility--Progressing as of 2/3/23  3. The patient will achieve 5 sit to stand score of 14 seconds to demonstrate improved transfers and endurance. --MET as of 2/3/23     Long Term Goals: 12 weeks   1. The patient will demonstrate independence with extensive HEP.  2. Patient will achieve 5 sit to stand score of 12 seconds to demonstrate improved transfers & endurance.  3. Patent is able to demonstrate MMT 4+/5 B knees and hips without pain reports during testing.    4. The patient is able to demonstrate SLS at least 10 seconds without use of hands for support.     Long Term Goal Status: continue per initial plan of care.    PLAN     Updated Certification Period: 2/3/23 to 3/3/23   Recommended Treatment Plan: 2 times per week for 4 weeks:  Aquatic Therapy      Fernanda Modi PTA

## 2023-02-16 ENCOUNTER — CLINICAL SUPPORT (OUTPATIENT)
Dept: REHABILITATION | Facility: HOSPITAL | Age: 78
End: 2023-02-16
Payer: MEDICARE

## 2023-02-16 DIAGNOSIS — Z91.81 AT RISK FOR FALLS: Primary | ICD-10-CM

## 2023-02-16 PROCEDURE — 97113 AQUATIC THERAPY/EXERCISES: CPT

## 2023-02-16 NOTE — PROGRESS NOTES
"[facility]  Physical Therapy Daily Treatment Note    Name: Wendi Edmonds  Clinic Number: [id number]    Therapy Diagnosis:   No diagnosis found.      Physician: Dayo Jameson MD    Visit Date: 2/16/2023    Physician Orders: Aquatic Therapy   Medical Diagnosis from Referral: At risk for falls  Evaluation Date: 12/1/2022  Authorization Period Expiration: 12/29/23  Plan of Care Expiration: 3/3/2023  Visit # / Visits authorized: 13/20  21 visits total     Precautions: Standard and Fall    PTA Visit #: 1/5     Time In: 10:58 am   Time Out: 12:00 pm  Total Billable Time: 62 minutes     SUBJECTIVE     The patient reports: no pain or c/o increased unsteadiness this session.    Pain: 2/10    OBJECTIVE     Update 2/3/23    Treatment     Wendi received aquatic therapeutic exercises to develop strength, endurance, ROM, flexibility, posture, and core stabilization for 70 minutes including:    + 3.75 ankle weights added    FUNCTIONAL MOBILITY TRAINING x 2 laps each at beginning and 1 lap each at end of session  Walk forward/backward/lateral    STRETCHES 2 x 30sec  Hamstring     LE EX x 25  3.75 # ankle weight   Heel raise into squat  Hip abduction  Hip flex/ext  LAQ   HS curl  Standing clam  Sit to stand x 30 reps without use of hands for assist  Step up on 8" red step x 30 reps each     UE EX/CORE  x 30  Shoulder flex/ext TA activation with multicolor dumbbells  Shoulder horizontal abd/add TA activation with multicolor dumbbells  Shoulder abd/add with multicolor Dumbbells     Balance  Walking slow marches x 2 laps with orange dumbbell  Tandem walking x 2 laps  DL feet together EO with head turns side to side and up and down x 1' each   Tandem balance x 1' each foot forward  with head turns    ENDURANCE  Bicycle in // bars 5'    ASSESSMENT     Pt able to tolerate mild progressions of step ups this session, along with continuation of previous exercises with no exacerbation of sx. Minimal verbal " cueing required throughout session. Will plan to progress balance exercises within tolerance    Previous Short Term Goals Status:       Short Term Goals: 6 weeks   1. Patient will be independent in HEP & progressions.--Ongoing as of 2/3/23  2. Patient will achieve TUG score of 10 sec to demonstrate improved mobility--Progressing as of 2/3/23  3. The patient will achieve 5 sit to stand score of 14 seconds to demonstrate improved transfers and endurance. --MET as of 2/3/23     Long Term Goals: 12 weeks   1. The patient will demonstrate independence with extensive HEP.  2. Patient will achieve 5 sit to stand score of 12 seconds to demonstrate improved transfers & endurance.  3. Patent is able to demonstrate MMT 4+/5 B knees and hips without pain reports during testing.    4. The patient is able to demonstrate SLS at least 10 seconds without use of hands for support.     Long Term Goal Status: continue per initial plan of care.    PLAN     Updated Certification Period: 2/3/23 to 3/3/23   Recommended Treatment Plan: 2 times per week for 4 weeks:  Aquatic Therapy      Jameel Giang, PT

## 2023-02-20 ENCOUNTER — CLINICAL SUPPORT (OUTPATIENT)
Dept: REHABILITATION | Facility: HOSPITAL | Age: 78
End: 2023-02-20
Payer: MEDICARE

## 2023-02-20 DIAGNOSIS — Z91.81 AT RISK FOR FALLS: Primary | ICD-10-CM

## 2023-02-20 PROCEDURE — 97113 AQUATIC THERAPY/EXERCISES: CPT

## 2023-02-20 NOTE — PROGRESS NOTES
"[facility]  Physical Therapy Daily Treatment Note    Name: Wendi Edmonds  Clinic Number: [id number]    Therapy Diagnosis:   Encounter Diagnosis   Name Primary?    At risk for falls Yes       Physician: Dayo Jameson MD    Visit Date: 2/20/2023    Physician Orders: Aquatic Therapy   Medical Diagnosis from Referral: At risk for falls  Evaluation Date: 12/1/2022  Authorization Period Expiration: 12/29/23  Plan of Care Expiration: 3/3/2023  Visit # / Visits authorized: 15/20  23 visits total     Precautions: Standard and Fall    PTA Visit #: 0/5     Time In: 10:05 am  Time Out: 11:30 am    Total Billable Time: 85 minutes     SUBJECTIVE     The patient reports: no pain or c/o increased unsteadiness this session.    Pain: 2/10    OBJECTIVE     Update 2/3/23    Treatment     Wendi received aquatic therapeutic exercises to develop strength, endurance, ROM, flexibility, posture, and core stabilization for 85 minutes including:    FUNCTIONAL MOBILITY TRAINING x 2 laps each at beginning and 1 lap each at end of session  Walk forward/backward/lateral    STRETCHES 2 x 30sec  Hamstring     LE EX x 25  3.75 # ankle weight   Heel raise into squat  Hip abduction  Hip flex/ext  LAQ   HS curl  Standing clam    Sit to stand x 30 reps without use of hands for assist    Step up on 8" red step x 30 reps each     UE EX/CORE  x 30  Shoulder flex/ext TA activation with multicolor dumbbells  Shoulder horizontal abd/add TA activation with multicolor dumbbells  Shoulder abd/add with multicolor Dumbbells     Balance  Walking slow marches x 2 laps with orange dumbbell  Tandem walking with orange dumbbell x 2 laps    DL feet together EO with head turns side to side and up and down x 1' each   Tandem balance x 1' each foot forward  with head turns    ENDURANCE  Bicycle in // bars 5'    ASSESSMENT     The patient continues to progress well with therapy with reduced use of hands for assist with balance and improved " ability to sustain upright posture throughout the session with reduced need for VC.     Previous Short Term Goals Status:       Short Term Goals: 6 weeks   1. Patient will be independent in HEP & progressions.--Ongoing as of 2/3/23  2. Patient will achieve TUG score of 10 sec to demonstrate improved mobility--Progressing as of 2/3/23  3. The patient will achieve 5 sit to stand score of 14 seconds to demonstrate improved transfers and endurance. --MET as of 2/3/23     Long Term Goals: 12 weeks   1. The patient will demonstrate independence with extensive HEP.  2. Patient will achieve 5 sit to stand score of 12 seconds to demonstrate improved transfers & endurance.  3. Patent is able to demonstrate MMT 4+/5 B knees and hips without pain reports during testing.    4. The patient is able to demonstrate SLS at least 10 seconds without use of hands for support.     Long Term Goal Status: continue per initial plan of care.    PLAN     Updated Certification Period: 2/3/23 to 3/3/23     Recommended Treatment Plan: 2 times per week for 4 weeks:  Aquatic Therapy      Kristin Yao, PT

## 2023-02-22 ENCOUNTER — CLINICAL SUPPORT (OUTPATIENT)
Dept: REHABILITATION | Facility: HOSPITAL | Age: 78
End: 2023-02-22
Payer: MEDICARE

## 2023-02-22 DIAGNOSIS — Z91.81 AT RISK FOR FALLS: Primary | ICD-10-CM

## 2023-02-22 PROCEDURE — 97113 AQUATIC THERAPY/EXERCISES: CPT

## 2023-02-27 ENCOUNTER — CLINICAL SUPPORT (OUTPATIENT)
Dept: REHABILITATION | Facility: HOSPITAL | Age: 78
End: 2023-02-27
Payer: MEDICARE

## 2023-02-27 DIAGNOSIS — Z91.81 AT RISK FOR FALLS: Primary | ICD-10-CM

## 2023-02-27 PROCEDURE — 97113 AQUATIC THERAPY/EXERCISES: CPT

## 2023-02-27 NOTE — PROGRESS NOTES
"[facility]  Physical Therapy Daily Treatment Note    Name: Wendi Edmonds  Clinic Number: [id number]    Therapy Diagnosis:   Encounter Diagnosis   Name Primary?    At risk for falls Yes         Physician: Dayo Jameson MD    Visit Date: 2/27/2023    Physician Orders: Aquatic Therapy   Medical Diagnosis from Referral: At risk for falls  Evaluation Date: 12/1/2022  Authorization Period Expiration: 12/29/23  Plan of Care Expiration: 3/3/2023  Visit # / Visits authorized: 17/20  25 visits total     Precautions: Standard and Fall    PTA Visit #: 0/5     Time In: 9:30 am   Time Out: 10:45 am   Total Billable Time: 60 minutes     SUBJECTIVE     The patient reports: no pain or c/o increased unsteadiness this session.    Pain: 2/10    OBJECTIVE     Update 2/3/23    Treatment     Wendi received aquatic therapeutic exercises to develop strength, endurance, ROM, flexibility, posture, and core stabilization for 75 minutes including:    FUNCTIONAL MOBILITY TRAINING x 2 laps each at beginning and 1 lap each at end of session  Walk forward/backward/lateral    STRETCHES 2 x 30sec  Hamstring     LE EX x 30 5# ankle weight   Heel raise into squat  Hip flex/abd/ext combo  LAQ   HS curl  Standing clam    Sit to stand x 30 reps without use of hands for assist    Step up on 8" red step x 30 reps each     UE EX/CORE  x 30  Shoulder flex/ext TA activation with multicolor dumbbells  Shoulder horizontal abd/add TA activation with multicolor dumbbells  Shoulder abd/add with multicolor Dumbbells     Blue kick board push/pull    Balance  Walking slow marches x 2 laps with orange dumbbell  Tandem walking with orange dumbbell x 2 laps    DL feet together EO with head turns side to side and up and down x 1' each   Tandem balance x 1' each foot forward  with head turns    ENDURANCE  Bicycle in // bars 5'    ASSESSMENT     The patient was able to perform all exercises with advanced 5# ankle weights today with good " quality and minimal fatigue noted.     Previous Short Term Goals Status:       Short Term Goals: 6 weeks   1. Patient will be independent in HEP & progressions.--Ongoing as of 2/3/23  2. Patient will achieve TUG score of 10 sec to demonstrate improved mobility--Progressing as of 2/3/23  3. The patient will achieve 5 sit to stand score of 14 seconds to demonstrate improved transfers and endurance. --MET as of 2/3/23     Long Term Goals: 12 weeks   1. The patient will demonstrate independence with extensive HEP.  2. Patient will achieve 5 sit to stand score of 12 seconds to demonstrate improved transfers & endurance.  3. Patent is able to demonstrate MMT 4+/5 B knees and hips without pain reports during testing.    4. The patient is able to demonstrate SLS at least 10 seconds without use of hands for support.     Long Term Goal Status: continue per initial plan of care.    PLAN     Updated Certification Period: 2/3/23 to 3/3/23     Recommended Treatment Plan: 2 times per week for 4 weeks:  Aquatic Therapy      Kristin Yao, PT

## 2023-03-02 ENCOUNTER — CLINICAL SUPPORT (OUTPATIENT)
Dept: REHABILITATION | Facility: HOSPITAL | Age: 78
End: 2023-03-02
Payer: MEDICARE

## 2023-03-02 DIAGNOSIS — Z91.81 AT RISK FOR FALLS: Primary | ICD-10-CM

## 2023-03-02 PROCEDURE — 97113 AQUATIC THERAPY/EXERCISES: CPT

## 2023-03-02 NOTE — PROGRESS NOTES
"[facility]  Physical Therapy Daily Treatment Note    Name: Wendi Edmonds  Clinic Number: [id number]    Therapy Diagnosis:   Encounter Diagnosis   Name Primary?    At risk for falls Yes       Physician: Dayo Jameson MD    Visit Date: 3/2/2023    Physician Orders: Aquatic Therapy   Medical Diagnosis from Referral: At risk for falls  Evaluation Date: 12/1/2022  Authorization Period Expiration: 12/29/23  Plan of Care Expiration: 3/3/2023  Visit # / Visits authorized: 18/20  26 visits total     Precautions: Standard and Fall    PTA Visit #: 0/5     Time In: 12:25 pm  Time Out: 1:40 pm  Total Billable Time: 55 minutes     SUBJECTIVE     The patient reports: she is feeling pretty good today but is tired.    Pain: 2/10    OBJECTIVE     Update 2/3/23    Treatment     Wendi received aquatic therapeutic exercises to develop strength, endurance, ROM, flexibility, posture, and core stabilization for 75 minutes including:    FUNCTIONAL MOBILITY TRAINING x 2 laps each at beginning and 1 lap each at end of session  Walk forward/backward/lateral    STRETCHES 2 x 30sec  Hamstring     LE EX x 30 5# ankle weight   Heel raise into squat  Hip flex/abd/ext combo  LAQ   HS curl  Standing clam    Sit to stand x 30 reps without use of hands for assist    Step up on 8" red step x 30 reps each   Lateral step up on 8" step x 20 reps each    UE EX/CORE  x 30  Shoulder flex/ext TA activation with multicolor dumbbells  Shoulder horizontal abd/add TA activation with multicolor dumbbells  Shoulder abd/add with multicolor Dumbbells     Blue kick board push/pull    Balance  Walking slow marches x 2 laps with orange dumbbell  Tandem walking with orange dumbbell x 2 laps    DL feet together EO with head turns side to side and up and down x 1' each   Tandem balance x 1' each foot forward  with head turns    ENDURANCE  Bicycle in // bars 5'    ASSESSMENT     The patient reported fatigue today upon completion of the " session but was able to complete all exercises including adding side step ups     Previous Short Term Goals Status:       Short Term Goals: 6 weeks   1. Patient will be independent in HEP & progressions.--Ongoing as of 2/3/23  2. Patient will achieve TUG score of 10 sec to demonstrate improved mobility--Progressing as of 2/3/23  3. The patient will achieve 5 sit to stand score of 14 seconds to demonstrate improved transfers and endurance. --MET as of 2/3/23     Long Term Goals: 12 weeks   1. The patient will demonstrate independence with extensive HEP.  2. Patient will achieve 5 sit to stand score of 12 seconds to demonstrate improved transfers & endurance.  3. Patent is able to demonstrate MMT 4+/5 B knees and hips without pain reports during testing.    4. The patient is able to demonstrate SLS at least 10 seconds without use of hands for support.     Long Term Goal Status: continue per initial plan of care.    PLAN     Updated Certification Period: 2/3/23 to 3/3/23     Recommended Treatment Plan: 2 times per week for 4 weeks:  Aquatic Therapy      Kristin Yao, PT

## 2023-03-06 ENCOUNTER — CLINICAL SUPPORT (OUTPATIENT)
Dept: REHABILITATION | Facility: HOSPITAL | Age: 78
End: 2023-03-06
Payer: MEDICARE

## 2023-03-06 DIAGNOSIS — Z91.81 AT RISK FOR FALLS: Primary | ICD-10-CM

## 2023-03-06 PROCEDURE — 97113 AQUATIC THERAPY/EXERCISES: CPT

## 2023-03-06 NOTE — PROGRESS NOTES
"[facility]  Physical Therapy Daily Treatment Note    Name: Wendi Edmonds  Clinic Number: [id number]    Therapy Diagnosis:     Encounter Diagnosis   Name Primary?    At risk for falls Yes         Physician: Dayo Jameson MD    Visit Date: 3/6/2023    Physician Orders: Aquatic Therapy   Medical Diagnosis from Referral: At risk for falls  Evaluation Date: 12/1/2022  Authorization Period Expiration: 12/29/23  Plan of Care Expiration: 3/3/2023  Visit # / Visits authorized: 19/20  27 visits total     Precautions: Standard and Fall    PTA Visit #: 0/5     Time In: 3:08 pm  Time Out: 4:15 pm  Total Billable Time: 55 minutes     SUBJECTIVE     The patient reports: her legs were tired after last session but were not sore.     Pain: 2/10    OBJECTIVE     Update 2/3/23    Treatment     Wendi received aquatic therapeutic exercises to develop strength, endurance, ROM, flexibility, posture, and core stabilization for 75 minutes including:    FUNCTIONAL MOBILITY TRAINING x 2 laps each at beginning and 1 lap each at end of session  Walk forward/backward/lateral    STRETCHES 2 x 30sec  Hamstring     LE EX x 30 5# ankle weight   Heel raise into squat  Hip flex/abd/ext combo  LAQ   HS curl  Standing clam    Sit to stand x 30 reps without use of hands for assist    Step up on 8" red step x 30 reps each   Lateral step up on 8" step x 20 reps each - 15 today due to fatigue     UE EX/CORE  x 30  Shoulder flex/ext TA activation with multicolor dumbbells  Shoulder horizontal abd/add TA activation with multicolor dumbbells  Shoulder abd/add with multicolor Dumbbells     Blue kick board push/pull    Balance  Walking slow marches x 2 laps with orange dumbbell  Tandem walking with orange dumbbell x 2 laps    DL feet together EO with head turns side to side and up and down x 1' each   Tandem balance x 1' each foot forward  with head turns    ENDURANCE  Bicycle in // bars 5'    ASSESSMENT     Patient demonstrated " improved tolerance to the 5# ankle weights throughout session. Difficult staying grounded during upper extremity/core interventions but was able to to make improvements with cueing. Will progress as tolerated, patient's legs were fatigued following session.     Previous Short Term Goals Status:       Short Term Goals: 6 weeks   1. Patient will be independent in HEP & progressions.--Ongoing as of 2/3/23  2. Patient will achieve TUG score of 10 sec to demonstrate improved mobility--Progressing as of 2/3/23  3. The patient will achieve 5 sit to stand score of 14 seconds to demonstrate improved transfers and endurance. --MET as of 2/3/23     Long Term Goals: 12 weeks   1. The patient will demonstrate independence with extensive HEP.  2. Patient will achieve 5 sit to stand score of 12 seconds to demonstrate improved transfers & endurance.  3. Patent is able to demonstrate MMT 4+/5 B knees and hips without pain reports during testing.    4. The patient is able to demonstrate SLS at least 10 seconds without use of hands for support.     Long Term Goal Status: continue per initial plan of care.    PLAN     Updated Certification Period: 2/3/23 to 3/3/23     Recommended Treatment Plan: 2 times per week for 4 weeks:  Aquatic Therapy      Shanice Cervantes, PT

## 2023-03-08 ENCOUNTER — CLINICAL SUPPORT (OUTPATIENT)
Dept: REHABILITATION | Facility: HOSPITAL | Age: 78
End: 2023-03-08
Payer: MEDICARE

## 2023-03-08 DIAGNOSIS — Z91.81 AT RISK FOR FALLS: Primary | ICD-10-CM

## 2023-03-08 PROCEDURE — 97113 AQUATIC THERAPY/EXERCISES: CPT

## 2023-03-08 NOTE — PROGRESS NOTES
"[facility]  Physical Therapy Daily Treatment Note    Name: Wendi Edmonds  Clinic Number: [id number]    Therapy Diagnosis:     Encounter Diagnosis   Name Primary?    At risk for falls Yes       Physician: Dayo Jameson MD    Visit Date: 3/8/2023    Physician Orders: Aquatic Therapy   Medical Diagnosis from Referral: At risk for falls  Evaluation Date: 12/1/2022  Authorization Period Expiration: 12/29/23  Plan of Care Expiration: 3/3/2023  Visit # / Visits authorized: 20/32 28 visits total     Precautions: Standard and Fall    PTA Visit #: 0/5     Time In: 12:30 pm  Time Out: 1:45 pm  Total Billable Time: 30 minutes     SUBJECTIVE     The patient reports: is feeling tired today.     Pain: 2/10    OBJECTIVE     Update 2/3/23    Treatment     Wendi received aquatic therapeutic exercises to develop strength, endurance, ROM, flexibility, posture, and core stabilization for 75 minutes including:    FUNCTIONAL MOBILITY TRAINING x 2 laps each at beginning and 1 lap each at end of session  Walk forward/backward/lateral    STRETCHES 2 x 30sec  Hamstring     LE EX x 30 5# ankle weight   Heel raise into squat  Hip flex/abd/ext combo  LAQ   HS curl  Standing clam    Sit to stand x 30 reps without use of hands for assist    Step up on 8" red step x 30 reps each   Lateral step up on 8" step x 20 reps each     UE EX/CORE  x 30  Shoulder flex/ext TA activation with multicolor dumbbells  Shoulder horizontal abd/add TA activation with multicolor dumbbells  Shoulder abd/add with multicolor Dumbbells     Blue kick board push/pull    Balance  Walking slow marches x 2 laps with orange dumbbell  Tandem walking with orange dumbbell x 2 laps    DL feet together EO with head turns side to side and up and down x 1' each   Tandem balance x 1' each foot forward  with head turns    ENDURANCE  Bicycle in // bars 5'    ASSESSMENT     Despite some reports of fatigue she was able to complete all exercises with good " quality and endurance noted. She reported feeling the most fatigue with the bicycle today.    Previous Short Term Goals Status:       Short Term Goals: 6 weeks   1. Patient will be independent in HEP & progressions.--Ongoing as of 2/3/23  2. Patient will achieve TUG score of 10 sec to demonstrate improved mobility--Progressing as of 2/3/23  3. The patient will achieve 5 sit to stand score of 14 seconds to demonstrate improved transfers and endurance. --MET as of 2/3/23     Long Term Goals: 12 weeks   1. The patient will demonstrate independence with extensive HEP.  2. Patient will achieve 5 sit to stand score of 12 seconds to demonstrate improved transfers & endurance.  3. Patent is able to demonstrate MMT 4+/5 B knees and hips without pain reports during testing.    4. The patient is able to demonstrate SLS at least 10 seconds without use of hands for support.     Long Term Goal Status: continue per initial plan of care.    PLAN     Updated Certification Period: 2/3/23 to 3/3/23     Recommended Treatment Plan: 2 times per week for 4 weeks:  Aquatic Therapy      Kristin Yao, PT

## 2023-03-13 ENCOUNTER — CLINICAL SUPPORT (OUTPATIENT)
Dept: REHABILITATION | Facility: HOSPITAL | Age: 78
End: 2023-03-13
Payer: MEDICARE

## 2023-03-13 DIAGNOSIS — Z91.81 AT RISK FOR FALLS: Primary | ICD-10-CM

## 2023-03-13 PROCEDURE — 97113 AQUATIC THERAPY/EXERCISES: CPT

## 2023-03-13 NOTE — PROGRESS NOTES
"[facility]  Physical Therapy Daily Treatment Note    Name: Wendi Edmonds  Clinic Number: [id number]    Therapy Diagnosis:     Encounter Diagnosis   Name Primary?    At risk for falls Yes         Physician: Dayo Jameson MD    Visit Date: 3/13/2023    Physician Orders: Aquatic Therapy   Medical Diagnosis from Referral: At risk for falls  Evaluation Date: 12/1/2022  Authorization Period Expiration: 12/29/23  Plan of Care Expiration: 3/3/2023  Visit # / Visits authorized: 21/32 29 visits total     Precautions: Standard and Fall    PTA Visit #: 0/5     Time In: 3:05 pm  Time Out: 4:10 pm  Total Billable Time: 30 minutes     SUBJECTIVE     The patient reports: she feels extremely tired over all today. No extra pain or soreness but just tired.      Pain: 2/10    OBJECTIVE     Update 2/3/23    Treatment     Wendi received aquatic therapeutic exercises to develop strength, endurance, ROM, flexibility, posture, and core stabilization for 65 minutes including:    FUNCTIONAL MOBILITY TRAINING x 2 laps each at beginning and 1 lap each at end of session  Walk forward/backward/lateral    STRETCHES 2 x 30sec  Hamstring     LE EX x 30 5# ankle weight   Heel raise into squat  Hip flex/abd/ext combo  LAQ   HS curl  Standing clam    Sit to stand x 30 reps without use of hands for assist    Step up on 8" red step x 30 reps each   Lateral step up on 8" step x 20 reps each     UE EX/CORE  x 30  Shoulder flex/ext TA activation with multicolor dumbbells  Shoulder horizontal abd/add TA activation with multicolor dumbbells  Shoulder abd/add with multicolor Dumbbells     Blue kick board push/pull    Balance  Walking slow marches x 2 laps with orange dumbbell  Tandem walking with orange dumbbell x 2 laps    DL feet together EO with head turns side to side and up and down x 1' each   Tandem balance x 1' each foot forward  with head turns    ENDURANCE  Bicycle in // bars 5'    ASSESSMENT     Patient was " challenged throughout session due to fatigue today. Patient requires minimal cueing throughout for proper execution of interventions.     Previous Short Term Goals Status:       Short Term Goals: 6 weeks   1. Patient will be independent in HEP & progressions.--Ongoing as of 2/3/23  2. Patient will achieve TUG score of 10 sec to demonstrate improved mobility--Progressing as of 2/3/23  3. The patient will achieve 5 sit to stand score of 14 seconds to demonstrate improved transfers and endurance. --MET as of 2/3/23     Long Term Goals: 12 weeks   1. The patient will demonstrate independence with extensive HEP.  2. Patient will achieve 5 sit to stand score of 12 seconds to demonstrate improved transfers & endurance.  3. Patent is able to demonstrate MMT 4+/5 B knees and hips without pain reports during testing.    4. The patient is able to demonstrate SLS at least 10 seconds without use of hands for support.     Long Term Goal Status: continue per initial plan of care.    PLAN     Updated Certification Period: 2/3/23 to 3/3/23     Recommended Treatment Plan: 2 times per week for 4 weeks:  Aquatic Therapy      Shanice Cervantes, PT

## 2023-03-16 ENCOUNTER — CLINICAL SUPPORT (OUTPATIENT)
Dept: REHABILITATION | Facility: HOSPITAL | Age: 78
End: 2023-03-16
Payer: MEDICARE

## 2023-03-16 DIAGNOSIS — Z91.81 AT RISK FOR FALLS: Primary | ICD-10-CM

## 2023-03-16 PROCEDURE — 97164 PT RE-EVAL EST PLAN CARE: CPT

## 2023-03-16 PROCEDURE — 97113 AQUATIC THERAPY/EXERCISES: CPT

## 2023-03-16 NOTE — PROGRESS NOTES
"[facility]  Physical Therapy Plan of Care Note    Name: Wendi Edmonds  Clinic Number: [id number]    Therapy Diagnosis:   Encounter Diagnosis   Name Primary?    At risk for falls Yes       Physician: Dayo Jameson MD    Visit Date: 3/16/2023    Physician Orders: Aquatic Therapy     Medical Diagnosis from Referral: At risk for falls  Evaluation Date: 12/1/2022  Authorization Period Expiration: 12/29/23  Plan of Care Expiration: 3/3/2023  Visit # / Visits authorized: 22/32 30 visits total     Precautions: Standard and Fall    PTA Visit #: 0/5     Time In: 9:35 am  Time Out: 10:30 am   Total Billable Time: 30 minutes     SUBJECTIVE     Update: The patient reports that she is able to stand up better while she is walking and overall is walking betters. She feels like she has some isused with balance and has to be careful but more sturdy.        Pain: 2/10       OBJECTIVE     Update 3/16/23    TUG:   3/16/23 10 seconds without AD  2/3/23  10 seconds without use of AD.  12/1/22 11 seconds without use of AD.     5 Times Sit to Stand:   3/16/23 12 seconds without use of hands for support  2/3/23  13 seconds without use of hands for support  12/1/22 16 seconds without using hands for support      Gait: The patient ambulates with R LE in significant ER with R foot drop noted, moderate antalgic gait and lateral trunk lean with forward flexed posture noted.      Lower Extremity Strength  Right LE   Left LE     Knee extension: 4+/5 Knee extension: 4+/5   Knee flexion: 4+/5 Knee flexion: 4+/5   Hip flexion: 4/5 Hip flexion: 4/5   Hip extension:  4/5 Hip extension: 4/5   Hip abduction: 4/5 Hip abduction: 4/5   Ankle dorsiflexion: 3-/5 Ankle dorsiflexion: 5/5   Ankle plantarflexion: 4/5 Ankle plantarflexion: 4+/5                                Balance:     DL feet together EO: 30" with minimal sway  DL feet together EC: 30" with minimal sway     SLS R: 4 seconds prior to use of hand for support  SLS L " "5 seconds prior to use of hand for support.     TREATMENT     Wendi received aquatic therapeutic exercises to develop strength, endurance, ROM, flexibility, posture, and core stabilization for 65 minutes including:    FUNCTIONAL MOBILITY TRAINING x 2 laps each at beginning and 1 lap each at end of session  Walk forward/backward/lateral    STRETCHES 2 x 30sec  Hamstring     LE EX x 30 5# ankle weight   Heel raise into squat  Hip flex/abd/ext combo  LAQ   HS curl  Standing clam    Sit to stand x 30 reps without use of hands for assist    Step up on 8" red step x 30 reps each   Lateral step up on 8" step x 20 reps each     UE EX/CORE  x 30  Shoulder flex/ext TA activation with multicolor dumbbells  Shoulder horizontal abd/add TA activation with multicolor dumbbells  Shoulder abd/add with multicolor Dumbbells     Blue kick board push/pull    Balance  Walking slow marches x 2 laps with orange dumbbell  Tandem walking with orange dumbbell x 2 laps    DL feet together EO with head turns side to side and up and down x 1' each   Tandem balance x 1' each foot forward  with head turns    ENDURANCE  Bicycle in // bars 5'      ASSESSMENT     Update: The patient has attended 30 PT visits since the start of care. Subjectively she reports improved endurance with standing upright, improved ability to walk with reduced fatigue and LOB. Objectively she continues to demonstrate improved time for TUG and 5 times sit to stand. In addition she continues to demonstrate improved strength B LE and good tolerance to treatment and exercise progressions. She continues to make subjective and objective improvements with therapy and could benefit from additional skilled PT interventions to continue to progress strength and improve balance/gait.           Previous Short Term Goals Status:     Short Term Goals: 6 weeks   1. Patient will be independent in HEP & progressions.--Ongoing as of 2/3/23  2. Patient will achieve TUG score of 10 sec to " demonstrate improved mobility--MET as of 3/16/23  3. The patient will achieve 5 sit to stand score of 14 seconds to demonstrate improved transfers and endurance. --MET as of 2/3/23     Long Term Goals: 12 weeks   1. The patient will demonstrate independence with extensive HEP.  2. Patient will achieve 5 sit to stand score of 12 seconds to demonstrate improved transfers & endurance.--MET as of 3/16/23  3. Patent is able to demonstrate MMT 4+/5 B knees and hips without pain reports during testing.  --Progressing as of 3/16/23  4. The patient is able to demonstrate SLS at least 10 seconds without use of hands for support. --Ongoing as of 3/16/23    Long Term Goal Status: continue per initial plan of care.      PLAN     Updated Certification Period: 3/16/23 to 4/16/23   Recommended Treatment Plan: 2 times per week for 4 weeks:  Aquatic Therapy    Kristin Yao, PT    I CERTIFY THE NEED FOR THESE SERVICES FURNISHED UNDER THIS PLAN OF TREATMENT AND WHILE UNDER MY CARE  Physician's comments:      Physician's Signature: ___________________________________________________

## 2023-03-17 NOTE — PLAN OF CARE
"[facility]  Physical Therapy Plan of Care Note    Name: Wendi Edmonds  Clinic Number: [id number]    Therapy Diagnosis:   Encounter Diagnosis   Name Primary?    At risk for falls Yes       Physician: Dayo Jameson MD    Visit Date: 3/16/2023    Physician Orders: Aquatic Therapy     Medical Diagnosis from Referral: At risk for falls  Evaluation Date: 12/1/2022  Authorization Period Expiration: 12/29/23  Plan of Care Expiration: 3/3/2023  Visit # / Visits authorized: 22/32 30 visits total     Precautions: Standard and Fall    PTA Visit #: 0/5     Time In: 9:35 am  Time Out: 10:30 am   Total Billable Time: 30 minutes     SUBJECTIVE     Update: The patient reports that she is able to stand up better while she is walking and overall is walking betters. She feels like she has some isused with balance and has to be careful but more sturdy.        Pain: 2/10       OBJECTIVE     Update 3/16/23    TUG:   3/16/23 10 seconds without AD  2/3/23  10 seconds without use of AD.  12/1/22 11 seconds without use of AD.     5 Times Sit to Stand:   3/16/23 12 seconds without use of hands for support  2/3/23  13 seconds without use of hands for support  12/1/22 16 seconds without using hands for support      Gait: The patient ambulates with R LE in significant ER with R foot drop noted, moderate antalgic gait and lateral trunk lean with forward flexed posture noted.      Lower Extremity Strength  Right LE   Left LE     Knee extension: 4+/5 Knee extension: 4+/5   Knee flexion: 4+/5 Knee flexion: 4+/5   Hip flexion: 4/5 Hip flexion: 4/5   Hip extension:  4/5 Hip extension: 4/5   Hip abduction: 4/5 Hip abduction: 4/5   Ankle dorsiflexion: 3-/5 Ankle dorsiflexion: 5/5   Ankle plantarflexion: 4/5 Ankle plantarflexion: 4+/5                                Balance:     DL feet together EO: 30" with minimal sway  DL feet together EC: 30" with minimal sway     SLS R: 4 seconds prior to use of hand for support  SLS L " "5 seconds prior to use of hand for support.     TREATMENT     Wendi received aquatic therapeutic exercises to develop strength, endurance, ROM, flexibility, posture, and core stabilization for 65 minutes including:    FUNCTIONAL MOBILITY TRAINING x 2 laps each at beginning and 1 lap each at end of session  Walk forward/backward/lateral    STRETCHES 2 x 30sec  Hamstring     LE EX x 30 5# ankle weight   Heel raise into squat  Hip flex/abd/ext combo  LAQ   HS curl  Standing clam    Sit to stand x 30 reps without use of hands for assist    Step up on 8" red step x 30 reps each   Lateral step up on 8" step x 20 reps each     UE EX/CORE  x 30  Shoulder flex/ext TA activation with multicolor dumbbells  Shoulder horizontal abd/add TA activation with multicolor dumbbells  Shoulder abd/add with multicolor Dumbbells     Blue kick board push/pull    Balance  Walking slow marches x 2 laps with orange dumbbell  Tandem walking with orange dumbbell x 2 laps    DL feet together EO with head turns side to side and up and down x 1' each   Tandem balance x 1' each foot forward  with head turns    ENDURANCE  Bicycle in // bars 5'      ASSESSMENT     Update: The patient has attended 30 PT visits since the start of care. Subjectively she reports improved endurance with standing upright, improved ability to walk with reduced fatigue and LOB. Objectively she continues to demonstrate improved time for TUG and 5 times sit to stand. In addition she continues to demonstrate improved strength B LE and good tolerance to treatment and exercise progressions. She continues to make subjective and objective improvements with therapy and could benefit from additional skilled PT interventions to continue to progress strength and improve balance/gait.           Previous Short Term Goals Status:     Short Term Goals: 6 weeks   1. Patient will be independent in HEP & progressions.--Ongoing as of 2/3/23  2. Patient will achieve TUG score of 10 sec to " demonstrate improved mobility--MET as of 3/16/23  3. The patient will achieve 5 sit to stand score of 14 seconds to demonstrate improved transfers and endurance. --MET as of 2/3/23     Long Term Goals: 12 weeks   1. The patient will demonstrate independence with extensive HEP.  2. Patient will achieve 5 sit to stand score of 12 seconds to demonstrate improved transfers & endurance.--MET as of 3/16/23  3. Patent is able to demonstrate MMT 4+/5 B knees and hips without pain reports during testing.  --Progressing as of 3/16/23  4. The patient is able to demonstrate SLS at least 10 seconds without use of hands for support. --Ongoing as of 3/16/23    Long Term Goal Status: continue per initial plan of care.      PLAN     Updated Certification Period: 3/16/23 to 4/16/23   Recommended Treatment Plan: 2 times per week for 4 weeks:  Aquatic Therapy    Kristin Yao, PT    I CERTIFY THE NEED FOR THESE SERVICES FURNISHED UNDER THIS PLAN OF TREATMENT AND WHILE UNDER MY CARE  Physician's comments:      Physician's Signature: ___________________________________________________

## 2023-03-22 ENCOUNTER — CLINICAL SUPPORT (OUTPATIENT)
Dept: REHABILITATION | Facility: HOSPITAL | Age: 78
End: 2023-03-22
Payer: MEDICARE

## 2023-03-22 DIAGNOSIS — Z91.81 AT RISK FOR FALLS: Primary | ICD-10-CM

## 2023-03-22 PROCEDURE — 97113 AQUATIC THERAPY/EXERCISES: CPT

## 2023-03-22 NOTE — PROGRESS NOTES
"[facility]  Physical Therapy Daily Treatment Note    Name: Wendi Edmonds  Clinic Number: [id number]    Therapy Diagnosis:   Encounter Diagnosis   Name Primary?    At risk for falls Yes       Physician: Dayo Jameson MD    Visit Date: 3/22/2023    Physician Orders: Aquatic Therapy     Medical Diagnosis from Referral: At risk for falls  Evaluation Date: 12/1/2022  Authorization Period Expiration: 12/29/23  Plan of Care Expiration: 3/3/2023  Visit # / Visits authorized: 22/32 30 visits total     Precautions: Standard and Fall    PTA Visit #: 0/5     Time In: 9:30 am  Time Out: 10:35 am   Total Billable Time: 65 minutes     SUBJECTIVE     The patient reports: she is feeling a lot better today now that it is less cold.     Pain: 2/10    OBJECTIVE     Update 3/16/23    TREATMENT     Wendi received aquatic therapeutic exercises to develop strength, endurance, ROM, flexibility, posture, and core stabilization for 65 minutes including:    FUNCTIONAL MOBILITY TRAINING x 2 laps each at beginning and 1 lap each at end of session  Walk forward/backward/lateral    STRETCHES 2 x 30sec  Hamstring     LE EX x 30 5# ankle weight   Heel raise into squat  Hip flex/abd/ext combo  LAQ   HS curl  Standing clam    Sit to stand x 30 reps without use of hands for assist    Step up on 8" red step x 30 reps each   Lateral step up on 8" step x 20 reps each     UE EX/CORE  x 30  Shoulder flex/ext TA activation with multicolor dumbbells  Shoulder horizontal abd/add TA activation with multicolor dumbbells  Shoulder abd/add with multicolor Dumbbells     Blue kick board push/pull    Balance  Walking slow marches x 2 laps with orange dumbbell  Tandem walking with orange dumbbell x 2 laps    DL feet together EO with head turns side to side and up and down x 1' each   Tandem balance x 1' each foot forward  with head turns    ENDURANCE  Bicycle in // bars 5'      ASSESSMENT     The patient continues to progress well " with therapy with no reports of pain but with some reports of fatigue in the last few minutes of the bicycle with the 5# weights.       Previous Short Term Goals Status:     Short Term Goals: 6 weeks   1. Patient will be independent in HEP & progressions.--Ongoing as of 2/3/23  2. Patient will achieve TUG score of 10 sec to demonstrate improved mobility--MET as of 3/16/23  3. The patient will achieve 5 sit to stand score of 14 seconds to demonstrate improved transfers and endurance. --MET as of 2/3/23     Long Term Goals: 12 weeks   1. The patient will demonstrate independence with extensive HEP.  2. Patient will achieve 5 sit to stand score of 12 seconds to demonstrate improved transfers & endurance.--MET as of 3/16/23  3. Patent is able to demonstrate MMT 4+/5 B knees and hips without pain reports during testing.  --Progressing as of 3/16/23  4. The patient is able to demonstrate SLS at least 10 seconds without use of hands for support. --Ongoing as of 3/16/23      PLAN   Progress POC as tolerated by the patient.     Updated Certification Period: 3/16/23 to 4/16/23   Recommended Treatment Plan: 2 times per week for 4 weeks:  Aquatic Therapy    Kristin Yao, PT

## 2023-03-24 ENCOUNTER — CLINICAL SUPPORT (OUTPATIENT)
Dept: REHABILITATION | Facility: HOSPITAL | Age: 78
End: 2023-03-24
Payer: MEDICARE

## 2023-03-24 DIAGNOSIS — Z91.81 AT RISK FOR FALLS: Primary | ICD-10-CM

## 2023-03-24 PROCEDURE — 97113 AQUATIC THERAPY/EXERCISES: CPT

## 2023-03-24 NOTE — PROGRESS NOTES
"[facility]  Physical Therapy Daily Treatment Note    Name: Wendi Edmonds  Clinic Number: [id number]    Therapy Diagnosis:   Encounter Diagnosis   Name Primary?    At risk for falls Yes       Physician: Dayo Jameson MD    Visit Date: 3/24/2023    Physician Orders: Aquatic Therapy     Medical Diagnosis from Referral: At risk for falls  Evaluation Date: 12/1/2022  Authorization Period Expiration: 12/29/23  Plan of Care Expiration: 3/3/2023  Visit # / Visits authorized: 24/32 31 visits total     Precautions: Standard and Fall    PTA Visit #: 0/5     Time In: 9:05 am  Time Out: 10:40 am   Total Billable Time: 25 minutes     SUBJECTIVE     The patient reports: she is feeling okay today but is just a little stressed about her cats so she feels a little rushed today    Pain: 2/10    OBJECTIVE     Updated 3/16/23    TREATMENT     Wendi received aquatic therapeutic exercises to develop strength, endurance, ROM, flexibility, posture, and core stabilization for 95 minutes including:    FUNCTIONAL MOBILITY TRAINING x 2 laps each at beginning and 1 lap each at end of session  Walk forward/backward/lateral    STRETCHES 2 x 30sec  Hamstring     LE EX x 30 5# ankle weight   Heel raise into squat  Hip flex/abd/ext combo  LAQ   HS curl  Standing clam    Sit to stand x 30 reps without use of hands for assist    Step up on 8" red step x 30 reps each   Lateral step up on 8" step x 20 reps each   Step down on 4" step x 10     UE EX/CORE  x 30  Shoulder flex/ext TA activation with multicolor dumbbells  Shoulder horizontal abd/add TA activation with multicolor dumbbells  Shoulder abd/add with multicolor Dumbbells     Blue kick board push/pull    Balance  Walking slow marches x 2 laps with orange dumbbell  Tandem walking with orange dumbbell x 2 laps    DL feet together EO with head turns side to side and up and down x 1' each   Tandem balance x 1' each foot forward  with head turns    ENDURANCE  Bicycle in " // bars 5'      ASSESSMENT     Patient continues to tolerate session well without reporting adverse effects. Demonstrates increased sway with upper extremity/TA activation exercises. Lower extremity fatigue continues to occur with bicycles at the end of the session due to the 5# ankle weights however patient is adjusting well.       Previous Short Term Goals Status:     Short Term Goals: 6 weeks   1. Patient will be independent in HEP & progressions.--Ongoing as of 2/3/23  2. Patient will achieve TUG score of 10 sec to demonstrate improved mobility--MET as of 3/16/23  3. The patient will achieve 5 sit to stand score of 14 seconds to demonstrate improved transfers and endurance. --MET as of 2/3/23     Long Term Goals: 12 weeks   1. The patient will demonstrate independence with extensive HEP.  2. Patient will achieve 5 sit to stand score of 12 seconds to demonstrate improved transfers & endurance.--MET as of 3/16/23  3. Patent is able to demonstrate MMT 4+/5 B knees and hips without pain reports during testing.  --Progressing as of 3/16/23  4. The patient is able to demonstrate SLS at least 10 seconds without use of hands for support. --Ongoing as of 3/16/23      PLAN   Progress POC as tolerated by the patient.     Updated Certification Period: 3/16/23 to 4/16/23   Recommended Treatment Plan: 2 times per week for 4 weeks:  Aquatic Therapy    Shanice Cervantes, PT

## 2023-03-28 ENCOUNTER — CLINICAL SUPPORT (OUTPATIENT)
Dept: REHABILITATION | Facility: HOSPITAL | Age: 78
End: 2023-03-28
Payer: MEDICARE

## 2023-03-28 DIAGNOSIS — Z91.81 AT RISK FOR FALLS: Primary | ICD-10-CM

## 2023-03-28 PROCEDURE — 97113 AQUATIC THERAPY/EXERCISES: CPT

## 2023-03-28 NOTE — PROGRESS NOTES
"[facility]  Physical Therapy Daily Treatment Note    Name: Wendi Edmonds  Clinic Number: [id number]    Therapy Diagnosis:   Encounter Diagnosis   Name Primary?    At risk for falls Yes       Physician: Dayo Jameson MD    Visit Date: 3/28/2023    Physician Orders: Aquatic Therapy     Medical Diagnosis from Referral: At risk for falls  Evaluation Date: 12/1/2022  Authorization Period Expiration: 12/29/23  Plan of Care Expiration: 4/15/23  Visit # / Visits authorized: 25/32 33 visits total     Precautions: Standard and Fall    PTA Visit #: 0/5     Time In: 9:30 am  Time Out: 10:35 am  Total Billable Time: 65 minutes     SUBJECTIVE     The patient reports: she had a good weekend and got to go to [place] to spend time with her family.       Pain: 2/10    OBJECTIVE     Updated 3/16/23    TREATMENT     Wendi received aquatic therapeutic exercises to develop strength, endurance, ROM, flexibility, posture, and core stabilization for 65 minutes including:    FUNCTIONAL MOBILITY TRAINING x 2 laps each at beginning and 1 lap each at end of session  Walk forward/backward/lateral    STRETCHES 2 x 30sec  Hamstring     LE EX x 30 5# ankle weight   Heel raise into squat  Hip flex/abd/ext combo  Standing clam    Sit to stand x 30 reps without use of hands for assist    Step up on 8" red step x 30 reps each   Lateral step up on 8" step x 30 reps each   Step down on 4" step x 15 each     UE EX/CORE  x 30  Shoulder flex/ext TA activation with multicolor dumbbells  Shoulder horizontal abd/add TA activation with multicolor dumbbells  Shoulder abd/add with multicolor Dumbbells     Blue kick board push/pull    Balance  Walking slow marches x 2 laps with orange dumbbell  Tandem walking with orange dumbbell x 2 laps    DL feet together EO with head turns side to side and up and down x 1' each   Tandem balance x 1' each foot forward with head turns    ENDURANCE  Bicycle in // bars 5'      ASSESSMENT   The " patient reported fatigue upon completion of the session today.      Previous Short Term Goals Status:     Short Term Goals: 6 weeks   1. Patient will be independent in HEP & progressions.--Ongoing as of 2/3/23  2. Patient will achieve TUG score of 10 sec to demonstrate improved mobility--MET as of 3/16/23  3. The patient will achieve 5 sit to stand score of 14 seconds to demonstrate improved transfers and endurance. --MET as of 2/3/23     Long Term Goals: 12 weeks   1. The patient will demonstrate independence with extensive HEP.  2. Patient will achieve 5 sit to stand score of 12 seconds to demonstrate improved transfers & endurance.--MET as of 3/16/23  3. Patent is able to demonstrate MMT 4+/5 B knees and hips without pain reports during testing.  --Progressing as of 3/16/23  4. The patient is able to demonstrate SLS at least 10 seconds without use of hands for support. --Ongoing as of 3/16/23      PLAN   Progress POC as tolerated by the patient.     Updated Certification Period: 3/16/23 to 4/16/23   Recommended Treatment Plan: 2 times per week for 4 weeks:  Aquatic Therapy    Kristin Yao, PT

## 2023-03-31 ENCOUNTER — CLINICAL SUPPORT (OUTPATIENT)
Dept: REHABILITATION | Facility: HOSPITAL | Age: 78
End: 2023-03-31
Payer: MEDICARE

## 2023-03-31 DIAGNOSIS — Z91.81 AT RISK FOR FALLS: Primary | ICD-10-CM

## 2023-03-31 PROCEDURE — 97113 AQUATIC THERAPY/EXERCISES: CPT

## 2023-03-31 NOTE — PROGRESS NOTES
"[facility]  Physical Therapy Daily Treatment Note    Name: Wendi Edmonds  Clinic Number: [id number]    Therapy Diagnosis:   Encounter Diagnosis   Name Primary?    At risk for falls Yes       Physician: Dayo Jameson MD    Visit Date: 3/31/2023    Physician Orders: Aquatic Therapy     Medical Diagnosis from Referral: At risk for falls  Evaluation Date: 12/1/2022  Authorization Period Expiration: 12/29/23  Plan of Care Expiration: 4/15/23  Visit # / Visits authorized: 26/32 34 visits total     Precautions: Standard and Fall    PTA Visit #: 0/5     Time In: 9:35 am  Time Out: 11:00 am   Total Billable Time: 85 minutes     SUBJECTIVE     The patient reports: her allergies are bothering her today.       Pain: 2/10    OBJECTIVE     Updated 3/16/23    TREATMENT     Wendi received aquatic therapeutic exercises to develop strength, endurance, ROM, flexibility, posture, and core stabilization for 85 minutes including:    FUNCTIONAL MOBILITY TRAINING x 2 laps each at beginning and 1 lap each at end of session  Walk forward/backward/lateral    STRETCHES 2 x 30sec  Hamstring     LE EX x 30 5# ankle weight   Heel raise into squat  Hip flex/abd/ext combo  Standing clam    Sit to stand x 30 reps without use of hands for assist    Step up on 8" red step x 30 reps each   Lateral step up on 8" step x 30 reps each   Step down on 4" step x 15 each     UE EX/CORE  x 30  Shoulder flex/ext TA activation with multicolor dumbbells  Shoulder horizontal abd/add TA activation with multicolor dumbbells  Shoulder abd/add with multicolor Dumbbells     Blue kick board push/pull    Balance  Walking slow marches x 2 laps with orange dumbbell  Tandem walking with orange dumbbell x 2 laps    DL feet together EO with head turns side to side and up and down x 1' each   Tandem balance x 1' each foot forward with head turns    ENDURANCE  Bicycle in // bars 5'    ASSESSMENT   The patient reported some fatigue throughout the " session today but able to complete all reps and sets.       Previous Short Term Goals Status:     Short Term Goals: 6 weeks   1. Patient will be independent in HEP & progressions.--Ongoing as of 2/3/23  2. Patient will achieve TUG score of 10 sec to demonstrate improved mobility--MET as of 3/16/23  3. The patient will achieve 5 sit to stand score of 14 seconds to demonstrate improved transfers and endurance. --MET as of 2/3/23     Long Term Goals: 12 weeks   1. The patient will demonstrate independence with extensive HEP.  2. Patient will achieve 5 sit to stand score of 12 seconds to demonstrate improved transfers & endurance.--MET as of 3/16/23  3. Patent is able to demonstrate MMT 4+/5 B knees and hips without pain reports during testing.  --Progressing as of 3/16/23  4. The patient is able to demonstrate SLS at least 10 seconds without use of hands for support. --Ongoing as of 3/16/23      PLAN   Progress POC as tolerated by the patient.     Updated Certification Period: 3/16/23 to 4/16/23   Recommended Treatment Plan: 2 times per week for 4 weeks:  Aquatic Therapy    Kristin Yao, PT

## 2023-04-04 ENCOUNTER — CLINICAL SUPPORT (OUTPATIENT)
Dept: REHABILITATION | Facility: HOSPITAL | Age: 78
End: 2023-04-04
Payer: MEDICARE

## 2023-04-04 DIAGNOSIS — Z91.81 AT RISK FOR FALLS: Primary | ICD-10-CM

## 2023-04-04 PROCEDURE — 97113 AQUATIC THERAPY/EXERCISES: CPT | Mod: KX

## 2023-04-04 NOTE — PROGRESS NOTES
"[facility]  Physical Therapy Daily Treatment Note    Name: Wendi Edmonds  Clinic Number: [id number]    Therapy Diagnosis:   Encounter Diagnosis   Name Primary?    At risk for falls Yes     Physician: Dayo Jameson MD    Visit Date: 4/4/2023    Physician Orders: Aquatic Therapy     Medical Diagnosis from Referral: At risk for falls  Evaluation Date: 12/1/2022  Authorization Period Expiration: 12/29/23  Plan of Care Expiration: 4/15/23  Visit # / Visits authorized: 27/32 35 visits total     Precautions: Standard and Fall    PTA Visit #: 0/5     Time In: 9:50 am  Time Out: 10:55 am   Total Billable Time: 30 minutes    SUBJECTIVE     The patient reports: she had a sleep study last night and was released at 4:00 am this morning so she is tired today.       Pain: 2/10    OBJECTIVE     Updated 3/16/23    TREATMENT     Wendi received aquatic therapeutic exercises to develop strength, endurance, ROM, flexibility, posture, and core stabilization for 65 minutes including:    FUNCTIONAL MOBILITY TRAINING x 2 laps each at beginning and 1 lap each at end of session  Walk forward/backward/lateral    STRETCHES 2 x 30sec  Hamstring     LE EX x 30 5# ankle weight   Heel raise into squat  Hip flex/abd/ext combo  Standing clam    Sit to stand x 30 reps without use of hands for assist    Step up on 8" red step x 30 reps each   Lateral step up on 8" step x 30 reps each   Step down on 4" step x 15 each     UE EX/CORE  x 30  Shoulder flex/ext TA activation with multicolor dumbbells  Shoulder horizontal abd/add TA activation with multicolor dumbbells  Shoulder abd/add with multicolor Dumbbells     Blue kick board push/pull    Balance  Walking slow marches x 2 laps with orange dumbbell  Tandem walking with orange dumbbell x 2 laps    DL feet together EO with head turns side to side and up and down x 1' each   Tandem balance x 1' each foot forward with head turns    ENDURANCE  Bicycle in // bars " 5'    ASSESSMENT   The patient was able to perform the exercises well today despite fatigue from her sleep study overnight.     Previous Short Term Goals Status:     Short Term Goals: 6 weeks   1. Patient will be independent in HEP & progressions.--Ongoing as of 2/3/23  2. Patient will achieve TUG score of 10 sec to demonstrate improved mobility--MET as of 3/16/23  3. The patient will achieve 5 sit to stand score of 14 seconds to demonstrate improved transfers and endurance. --MET as of 2/3/23     Long Term Goals: 12 weeks   1. The patient will demonstrate independence with extensive HEP.  2. Patient will achieve 5 sit to stand score of 12 seconds to demonstrate improved transfers & endurance.--MET as of 3/16/23  3. Patent is able to demonstrate MMT 4+/5 B knees and hips without pain reports during testing.  --Progressing as of 3/16/23  4. The patient is able to demonstrate SLS at least 10 seconds without use of hands for support. --Ongoing as of 3/16/23      PLAN   Progress POC as tolerated by the patient.     Updated Certification Period: 3/16/23 to 4/16/23   Recommended Treatment Plan: 2 times per week for 4 weeks:  Aquatic Therapy    Kristin Yao, PT

## 2023-04-11 ENCOUNTER — CLINICAL SUPPORT (OUTPATIENT)
Dept: REHABILITATION | Facility: HOSPITAL | Age: 78
End: 2023-04-11
Payer: MEDICARE

## 2023-04-11 DIAGNOSIS — Z91.81 AT RISK FOR FALLS: Primary | ICD-10-CM

## 2023-04-11 PROCEDURE — 97113 AQUATIC THERAPY/EXERCISES: CPT

## 2023-04-11 NOTE — PROGRESS NOTES
"[facility]  Physical Therapy Daily Treatment Note    Name: Wendi Edmonds  Clinic Number: [id number]    Therapy Diagnosis:   No diagnosis found.    Physician: Dayo Jameson MD    Visit Date: 4/11/2023    Physician Orders: Aquatic Therapy     Medical Diagnosis from Referral: At risk for falls  Evaluation Date: 12/1/2022  Authorization Period Expiration: 12/29/23  Plan of Care Expiration: 4/15/23  Visit # / Visits authorized: 27/32 35 visits total     Precautions: Standard and Fall    PTA Visit #: 0/5     Time In: 9:30 am  Time Out:  10:20 am   Total Billable Time: 45 minutes    SUBJECTIVE     The patient reports: she is here. She stated her allergies are bothering her a lot and she has a low energy level today.       Pain: 2/10    OBJECTIVE     Updated 3/16/23    TREATMENT     Wendi received aquatic therapeutic exercises to develop strength, endurance, ROM, flexibility, posture, and core stabilization for45 minutes including:    FUNCTIONAL MOBILITY TRAINING x 2 laps each at beginning and 1 lap each at end of session  Walk forward/backward/lateral    STRETCHES 2 x 30sec  Hamstring     LE EX x 30 5# ankle weight   Heel raise into squat  Hip flex/abd/ext combo  Standing clam    Sit to stand x 30 reps without use of hands for assist    Step up on 8" red step x 30 reps each   Lateral step up on 8" step x 30 reps each   Step down on 4" step x 15 each     UE EX/CORE  x 30  Shoulder flex/ext TA activation with multicolor dumbbells  Shoulder horizontal abd/add TA activation with multicolor dumbbells  Shoulder abd/add with multicolor Dumbbells     Blue kick board push/pull    Balance  Walking slow marches x 2 laps with orange dumbbell  Tandem walking with orange dumbbell x 2 laps    DL feet together EO with head turns side to side and up and down x 1' each   Tandem balance x 1' each foot forward with head turns    ENDURANCE  Bicycle in // bars 5'    ASSESSMENT   The patient continues to " tolerate the treatments well with reduced rest breaks and use of hands for balance needed.    Previous Short Term Goals Status:     Short Term Goals: 6 weeks   1. Patient will be independent in HEP & progressions.--Ongoing as of 2/3/23  2. Patient will achieve TUG score of 10 sec to demonstrate improved mobility--MET as of 3/16/23  3. The patient will achieve 5 sit to stand score of 14 seconds to demonstrate improved transfers and endurance. --MET as of 2/3/23     Long Term Goals: 12 weeks   1. The patient will demonstrate independence with extensive HEP.  2. Patient will achieve 5 sit to stand score of 12 seconds to demonstrate improved transfers & endurance.--MET as of 3/16/23  3. Patent is able to demonstrate MMT 4+/5 B knees and hips without pain reports during testing.  --Progressing as of 3/16/23  4. The patient is able to demonstrate SLS at least 10 seconds without use of hands for support. --Ongoing as of 3/16/23      PLAN   Progress POC as tolerated by the patient.     Updated Certification Period: 3/16/23 to 4/16/23   Recommended Treatment Plan: 2 times per week for 4 weeks:  Aquatic Therapy    Kristin Yao, PT

## 2023-04-13 ENCOUNTER — CLINICAL SUPPORT (OUTPATIENT)
Dept: REHABILITATION | Facility: HOSPITAL | Age: 78
End: 2023-04-13
Payer: MEDICARE

## 2023-04-13 DIAGNOSIS — Z91.81 AT RISK FOR FALLS: Primary | ICD-10-CM

## 2023-04-13 PROCEDURE — 97113 AQUATIC THERAPY/EXERCISES: CPT

## 2023-04-13 NOTE — PROGRESS NOTES
"  [facility]  Physical Therapy Daily Treatment Note    Name: Wendi Edmonds  Clinic Number: [id number]    Therapy Diagnosis:   Encounter Diagnosis   Name Primary?    At risk for falls Yes       Physician: Dayo Jameson MD    Visit Date: 4/13/2023    Physician Orders: Aquatic Therapy     Medical Diagnosis from Referral: At risk for falls  Evaluation Date: 12/1/2022  Authorization Period Expiration: 12/29/23  Plan of Care Expiration: 4/15/23  Visit # / Visits authorized: 29/44 37 visits total     Precautions: Standard and Fall    PTA Visit #: 0/5     Time In: 9:30 am  Time Out:  10:30 am     Total Billable Time: 60 minutes    SUBJECTIVE     The patient reports: her allergies are feeling better.       Pain: 2/10    OBJECTIVE     Updated 3/16/23    TREATMENT     Wendi received aquatic therapeutic exercises to develop strength, endurance, ROM, flexibility, posture, and core stabilization for 60 minutes including:    FUNCTIONAL MOBILITY TRAINING x 2 laps each at beginning and 1 lap each at end of session  Walk forward/backward/lateral    STRETCHES 2 x 30sec  Hamstring     LE EX x 30 5# ankle weight   Heel raise into squat  Hip flex/abd/ext combo  Standing clam    Sit to stand x 30 reps without use of hands for assist    Step up on 8" red step x 30 reps each   Lateral step up on 8" step x 30 reps each   Step down on 4" step x 25 each     UE EX/CORE  x 30  Shoulder flex/ext TA activation with multicolor dumbbells  Shoulder horizontal abd/add TA activation with multicolor dumbbells  Shoulder abd/add with multicolor Dumbbells     Blue kick board push/pull    Balance  Walking slow marches x 2 laps with orange dumbbell  Tandem walking with orange dumbbell x 2 laps  Tandem walking in // bars without use of hands for support unless needed x 2 laps    DL feet together EC x 1' each   Tandem balance x 1' each foot forward with head turns     ENDURANCE  Bicycle in // bars 5'    ASSESSMENT   The " patient was able to perform walking tandem balance in the // bars with very intermittent use of B UE for support.      Previous Short Term Goals Status:     Short Term Goals: 6 weeks   1. Patient will be independent in HEP & progressions.--Ongoing as of 2/3/23  2. Patient will achieve TUG score of 10 sec to demonstrate improved mobility--MET as of 3/16/23  3. The patient will achieve 5 sit to stand score of 14 seconds to demonstrate improved transfers and endurance. --MET as of 2/3/23     Long Term Goals: 12 weeks   1. The patient will demonstrate independence with extensive HEP.  2. Patient will achieve 5 sit to stand score of 12 seconds to demonstrate improved transfers & endurance.--MET as of 3/16/23  3. Patent is able to demonstrate MMT 4+/5 B knees and hips without pain reports during testing.  --Progressing as of 3/16/23  4. The patient is able to demonstrate SLS at least 10 seconds without use of hands for support. --Ongoing as of 3/16/23      PLAN   Progress POC as tolerated by the patient.     Updated Certification Period: 3/16/23 to 4/16/23   Recommended Treatment Plan: 2 times per week for 4 weeks:  Aquatic Therapy    Kristin Yao, PT

## 2023-04-18 ENCOUNTER — CLINICAL SUPPORT (OUTPATIENT)
Dept: REHABILITATION | Facility: HOSPITAL | Age: 78
End: 2023-04-18
Payer: MEDICARE

## 2023-04-18 DIAGNOSIS — Z91.81 AT RISK FOR FALLS: Primary | ICD-10-CM

## 2023-04-18 PROCEDURE — 97113 AQUATIC THERAPY/EXERCISES: CPT

## 2023-04-18 NOTE — PROGRESS NOTES
[facility]  Physical Therapy Plan of Care Note    Name: Wendi Edmonds  [facility] Number: [id number]    Therapy Diagnosis: No diagnosis found.  Physician: Dayo Jameson MD    Visit Date: 2023    Physician Orders: Aquatic Therapy     Medical Diagnosis from Referral: At risk for falls  Evaluation Date: 2022  Authorization Period Expiration: 23  Plan of Care Expiration: 4/15/23  Visit # / Visits authorized:  37 visits total     Precautions: Standard and Fall    PTA Visit #: 0/5     Time In: 9:40 am  Time Out:  10:30 am     Total Billable Time: 60 minutes      SUBJECTIVE     Update: The patient reports that she continues to feel benefit from attending Aquatic PT. She stated that she feels more confident with her walking but feels like she still leans forward while she is walking once her back gets fatigued. She stated that she has a pool in her complex but the water is too cold right now to get in. She is hoping over the next month it will improve.         OBJECTIVE     Update 23    TU23 11 seconds without use of AD  3/16/23 10 seconds without AD  2/3/23  10 seconds without use of AD.  22 11 seconds without use of AD.     5 Times Sit to Stand:   23 12 seconds without use of hands for support  3/16/23 12 seconds without use of hands for support  2/3/23  13 seconds without use of hands for support  22 16 seconds without using hands for support      Gait: The patient ambulates with R LE in significant ER with R foot drop noted, moderate antalgic gait and lateral trunk lean with forward flexed posture noted.      Lower Extremity Strength  Right LE   Left LE     Knee extension: 5/5 Knee extension: 4+/5   Knee flexion: 4+/5 Knee flexion: 5/5   Hip flexion: 4/5 Hip flexion: 4/5   Hip extension:  4/5 Hip extension: 4/5   Hip abduction: 4/5 Hip abduction: 4/5   Ankle dorsiflexion: 3-/5 Ankle dorsiflexion: 5/5   Ankle plantarflexion: 4+/5 Ankle  "plantarflexion: 4+/5                                Balance:     DL feet together EO: 30" with minimal sway  DL feet together EC: 30" with minimal sway     SLS R: Difficulty with attempt  SLS L 5 seconds prior to use of hand for support.     TREATMENT       Wendi received aquatic therapeutic exercises to develop strength, endurance, ROM, flexibility, posture, and core stabilization for 60 minutes including:    FUNCTIONAL MOBILITY TRAINING x 2 laps each at beginning and 1 lap each at end of session  Walk forward/backward/lateral    STRETCHES 2 x 30sec  Hamstring     LE EX x 30 5# ankle weight   Heel raise into squat  Hip flex/abd/ext combo  Standing clam    Sit to stand x 30 reps without use of hands for assist    Step up on 8" red step x 30 reps each   Lateral step up on 8" step x 30 reps each   Step down on 4" step x 25 each     UE EX/CORE  x 30  Shoulder flex/ext TA activation with multicolor dumbbells  Shoulder horizontal abd/add TA activation with multicolor dumbbells  Shoulder abd/add with multicolor Dumbbells     Blue kick board push/pull    Balance  Walking slow marches x 2 laps with orange dumbbell  Tandem walking with orange dumbbell x 2 laps  Tandem walking in // bars without use of hands for support unless needed x 2 laps    DL feet together EC x 1' each   Tandem balance x 1' each foot forward with head turns     ENDURANCE  Bicycle in // bars 5'      ASSESSMENT     Update:   The patient has attended 38 PT visits since the start of care. Subjectively she reports improved endurance and confidence with gait but continues with reports of forward leaning as she fatigues. Objectively she is reaching a plateau of progress with therapy. At this time she could benefit from 1 additional month of therapy to continue to expand her HEP and to have her continue with the Aquatic Program until her outdoor pool at her apartment complex is available for use.       Previous Short Term Goals Status:       Short Term Goals: " 6 weeks   1. Patient will be independent in HEP & progressions.--Ongoing as of 2/3/23  2. Patient will achieve TUG score of 10 sec to demonstrate improved mobility--MET as of 3/16/23  3. The patient will achieve 5 sit to stand score of 14 seconds to demonstrate improved transfers and endurance. --MET as of 2/3/23     Long Term Goals: 12 weeks   1. The patient will demonstrate independence with extensive HEP.  2. Patient will achieve 5 sit to stand score of 12 seconds to demonstrate improved transfers & endurance.--MET as of 3/16/23  3. Patent is able to demonstrate MMT 4+/5 B knees and hips without pain reports during testing.  --Progressing as of 4/18/23  4. The patient is able to demonstrate SLS at least 10 seconds without use of hands for support. --Ongoing as of 4/18/23      Long Term Goal Status: continue per initial plan of care.      PLAN     Updated Certification Period: 4/18/23 to 5/18/23   Recommended Treatment Plan: 2 times per week for 4 weeks:  Aquatic Therapy then d/c from PT with Aquatic HEP.    Kristin Yao, PT

## 2023-04-18 NOTE — PLAN OF CARE
[facility]  Physical Therapy Plan of Care Note    Name: Wendi Edmonds  [facility] Number: [id number]    Therapy Diagnosis: No diagnosis found.  Physician: Dayo Jameson MD    Visit Date: 2023    Physician Orders: Aquatic Therapy     Medical Diagnosis from Referral: At risk for falls  Evaluation Date: 2022  Authorization Period Expiration: 23  Plan of Care Expiration: 4/15/23  Visit # / Visits authorized:  37 visits total     Precautions: Standard and Fall    PTA Visit #: 0/5     Time In: 9:40 am  Time Out:  10:30 am     Total Billable Time: 60 minutes      SUBJECTIVE     Update: The patient reports that she continues to feel benefit from attending Aquatic PT. She stated that she feels more confident with her walking but feels like she still leans forward while she is walking once her back gets fatigued. She stated that she has a pool in her complex but the water is too cold right now to get in. She is hoping over the next month it will improve.         OBJECTIVE     Update 23    TU23 11 seconds without use of AD  3/16/23 10 seconds without AD  2/3/23  10 seconds without use of AD.  22 11 seconds without use of AD.     5 Times Sit to Stand:   23 12 seconds without use of hands for support  3/16/23 12 seconds without use of hands for support  2/3/23  13 seconds without use of hands for support  22 16 seconds without using hands for support      Gait: The patient ambulates with R LE in significant ER with R foot drop noted, moderate antalgic gait and lateral trunk lean with forward flexed posture noted.      Lower Extremity Strength  Right LE   Left LE     Knee extension: 5/5 Knee extension: 4+/5   Knee flexion: 4+/5 Knee flexion: 5/5   Hip flexion: 4/5 Hip flexion: 4/5   Hip extension:  4/5 Hip extension: 4/5   Hip abduction: 4/5 Hip abduction: 4/5   Ankle dorsiflexion: 3-/5 Ankle dorsiflexion: 5/5   Ankle plantarflexion: 4+/5 Ankle  "plantarflexion: 4+/5                                Balance:     DL feet together EO: 30" with minimal sway  DL feet together EC: 30" with minimal sway     SLS R: Difficulty with attempt  SLS L 5 seconds prior to use of hand for support.     TREATMENT       Wendi received aquatic therapeutic exercises to develop strength, endurance, ROM, flexibility, posture, and core stabilization for 60 minutes including:    FUNCTIONAL MOBILITY TRAINING x 2 laps each at beginning and 1 lap each at end of session  Walk forward/backward/lateral    STRETCHES 2 x 30sec  Hamstring     LE EX x 30 5# ankle weight   Heel raise into squat  Hip flex/abd/ext combo  Standing clam    Sit to stand x 30 reps without use of hands for assist    Step up on 8" red step x 30 reps each   Lateral step up on 8" step x 30 reps each   Step down on 4" step x 25 each     UE EX/CORE  x 30  Shoulder flex/ext TA activation with multicolor dumbbells  Shoulder horizontal abd/add TA activation with multicolor dumbbells  Shoulder abd/add with multicolor Dumbbells     Blue kick board push/pull    Balance  Walking slow marches x 2 laps with orange dumbbell  Tandem walking with orange dumbbell x 2 laps  Tandem walking in // bars without use of hands for support unless needed x 2 laps    DL feet together EC x 1' each   Tandem balance x 1' each foot forward with head turns     ENDURANCE  Bicycle in // bars 5'      ASSESSMENT     Update:   The patient has attended 38 PT visits since the start of care. Subjectively she reports improved endurance and confidence with gait but continues with reports of forward leaning as she fatigues. Objectively she is reaching a plateau of progress with therapy. At this time she could benefit from 1 additional month of therapy to continue to expand her HEP and to have her continue with the Aquatic Program until her outdoor pool at her apartment complex is available for use.       Previous Short Term Goals Status:       Short Term Goals: " 6 weeks   1. Patient will be independent in HEP & progressions.--Ongoing as of 2/3/23  2. Patient will achieve TUG score of 10 sec to demonstrate improved mobility--MET as of 3/16/23  3. The patient will achieve 5 sit to stand score of 14 seconds to demonstrate improved transfers and endurance. --MET as of 2/3/23     Long Term Goals: 12 weeks   1. The patient will demonstrate independence with extensive HEP.  2. Patient will achieve 5 sit to stand score of 12 seconds to demonstrate improved transfers & endurance.--MET as of 3/16/23  3. Patent is able to demonstrate MMT 4+/5 B knees and hips without pain reports during testing.  --Progressing as of 4/18/23  4. The patient is able to demonstrate SLS at least 10 seconds without use of hands for support. --Ongoing as of 4/18/23      Long Term Goal Status: continue per initial plan of care.      PLAN     Updated Certification Period: 4/18/23 to 5/18/23   Recommended Treatment Plan: 2 times per week for 4 weeks:  Aquatic Therapy then d/c from PT with Aquatic HEP.    Kristin Yoa, PT

## 2023-10-03 NOTE — PROGRESS NOTES
"[facility]  Physical Therapy Daily Treatment Note    Name: Wendi Edmonds  Clinic Number: [id number]    Therapy Diagnosis:   Encounter Diagnosis   Name Primary?    At risk for falls Yes       Physician: Dayo Jameson MD    Visit Date: 2/22/2023    Physician Orders: Aquatic Therapy   Medical Diagnosis from Referral: At risk for falls  Evaluation Date: 12/1/2022  Authorization Period Expiration: 12/29/23  Plan of Care Expiration: 3/3/2023  Visit # / Visits authorized: 15/20  23 visits total     Precautions: Standard and Fall    PTA Visit #: 0/5     Time In: 9:30 am   Time Out: 10:45 am  Total Billable Time: 25 minutes     SUBJECTIVE     The patient reports: no pain or c/o increased unsteadiness this session.    Pain: 2/10    OBJECTIVE     Update 2/3/23    Treatment     Wendi received aquatic therapeutic exercises to develop strength, endurance, ROM, flexibility, posture, and core stabilization for 75 minutes including:    FUNCTIONAL MOBILITY TRAINING x 2 laps each at beginning and 1 lap each at end of session  Walk forward/backward/lateral    STRETCHES 2 x 30sec  Hamstring     LE EX x 25  3.75 # ankle weight   Heel raise into squat  Hip abduction  Hip flex/ext  LAQ   HS curl  Standing clam    Sit to stand x 30 reps without use of hands for assist    Step up on 8" red step x 30 reps each     UE EX/CORE  x 30  Shoulder flex/ext TA activation with multicolor dumbbells  Shoulder horizontal abd/add TA activation with multicolor dumbbells  Shoulder abd/add with multicolor Dumbbells     Balance  Walking slow marches x 2 laps with orange dumbbell  Tandem walking with orange dumbbell x 2 laps    DL feet together EO with head turns side to side and up and down x 1' each   Tandem balance x 1' each foot forward  with head turns    ENDURANCE  Bicycle in // bars 5'    ASSESSMENT     The patient performed all exercises today with good quality of execution with reduced need for VC.     Previous Short " Nicotine patch transmitted Term Goals Status:       Short Term Goals: 6 weeks   1. Patient will be independent in HEP & progressions.--Ongoing as of 2/3/23  2. Patient will achieve TUG score of 10 sec to demonstrate improved mobility--Progressing as of 2/3/23  3. The patient will achieve 5 sit to stand score of 14 seconds to demonstrate improved transfers and endurance. --MET as of 2/3/23     Long Term Goals: 12 weeks   1. The patient will demonstrate independence with extensive HEP.  2. Patient will achieve 5 sit to stand score of 12 seconds to demonstrate improved transfers & endurance.  3. Patent is able to demonstrate MMT 4+/5 B knees and hips without pain reports during testing.    4. The patient is able to demonstrate SLS at least 10 seconds without use of hands for support.     Long Term Goal Status: continue per initial plan of care.    PLAN     Updated Certification Period: 2/3/23 to 3/3/23     Recommended Treatment Plan: 2 times per week for 4 weeks:  Aquatic Therapy      Kristin Yao, PT

## 2023-10-19 ENCOUNTER — TELEPHONE (OUTPATIENT)
Dept: NEUROLOGY | Facility: CLINIC | Age: 78
End: 2023-10-19
Payer: MEDICARE

## 2023-11-13 ENCOUNTER — TELEPHONE (OUTPATIENT)
Dept: NEUROLOGY | Facility: CLINIC | Age: 78
End: 2023-11-13
Payer: MEDICARE

## 2023-11-13 NOTE — TELEPHONE ENCOUNTER
Called pt for phone intake appt scheduled for 10:30am. She did not answer and a vm was left with this provider's number.

## 2023-11-22 ENCOUNTER — OFFICE VISIT (OUTPATIENT)
Dept: NEUROLOGY | Facility: CLINIC | Age: 78
End: 2023-11-22
Payer: MEDICARE

## 2023-11-22 DIAGNOSIS — F43.21 ADJUSTMENT DISORDER WITH DEPRESSED MOOD: Primary | ICD-10-CM

## 2023-11-22 DIAGNOSIS — G31.84 MILD COGNITIVE IMPAIRMENT: ICD-10-CM

## 2023-11-22 PROCEDURE — 99499 NO LOS: ICD-10-PCS | Mod: 95,,, | Performed by: CLINICAL NEUROPSYCHOLOGIST

## 2023-11-22 PROCEDURE — 90791 PR PSYCHIATRIC DIAGNOSTIC EVALUATION: ICD-10-PCS | Mod: 95,93,, | Performed by: CLINICAL NEUROPSYCHOLOGIST

## 2023-11-22 PROCEDURE — 90791 PSYCH DIAGNOSTIC EVALUATION: CPT | Mod: 95,93,, | Performed by: CLINICAL NEUROPSYCHOLOGIST

## 2023-11-22 PROCEDURE — 99499 UNLISTED E&M SERVICE: CPT | Mod: 95,,, | Performed by: CLINICAL NEUROPSYCHOLOGIST

## 2023-11-22 NOTE — PROGRESS NOTES
NEUROPSYCHOLOGY FOLLOW-UP APPOINTMENT - CONFIDENTIAL    Referring Provider: Johnna Arias, PhD, [facility]  Medical Necessity: Follow up on cognitive and emotional functioning, participate in treatment planning/management, and provide supportive therapy in the setting of mild cognitive impairment  Date Conducted: 2023  Present At Visit: the patient  Billin = 50 minutes  Referral Diagnoses: G31.84 (ICD-10-CM) - Mild cognitive impairment with memory loss   Consent: The patient expressed an understanding of the purpose of the evaluation and consented to all procedures. We discussed the limits of confidentiality and discussed an emergency plan.    Established Patient - Audio Only Telehealth Visit   The patient location is: [place]  The chief complaint leading to consultation is: mild cognitive impairment  Visit type: Virtual visit with audio only (telephone)  Total time spent with patient: 50 minutes   The reason for the audio only service rather than synchronous audio and video virtual visit was related to technical difficulties or patient preference/necessity.   Each patient to whom I provide medical services by telemedicine is: (1) informed of the relationship between the physician and patient and the respective role of any other health care provider with respect to management of the patient; and (2) notified that they may decline to receive medical services by telemedicine and may withdraw from such care at any time. Patient verbally consented to receive this service via voice-only telephone call.    ASSESSMENT & PLAN   Ms. Wendi Edmonds is an 76 y.o., female with 18 years of education and pertinent medical history including GEORGE not using CPAP, migraines with visual aura, hx of TIA, peripheral neuropathy, hx of right foot drop, hypothyroidism, HTN, anxiety, hx of SqCC, hx of L. Carotid endarterectomy, osteoarthritis, b/l knee replacement, and scoliosis who returns for a neuropsychological evaluation  check-in appointment given her diagnosis of mild cognitive impairment.     Discussed that she is not at a level of dementia and thus, a diagnosis of Mild Cognitive Impairment continues to be most appropriate.   Still does not sound neurodegenerative despite her report of gradually worsening cognition. That said, I can't rule it out.   Concern for GEORGE going untreated, sedentary lifestyle, worsening depression, and social isolation. These are considered to be the primary drivers of her worsening cognition.    We discussed whether or not repeat testing was necessary at this time and it was ultimately decided that we would hold off.   Discussed importance of increasing brain health behaviors, CPAP compliance, treating depression (discussed medication and talk therapy but is not interested in medication at all and is considering therapy).   Discussed importance of behavioral activation.   Planned to touch base 11/29/23 at 2 PM to go over additional recommendations and community resources and to determine if she would like for me to place referral for talk therapy.   Patient is on board with this plan and is going to think about our conversation in the interim.     Problem List Items Addressed This Visit          Neuro    Mild cognitive impairment       Psychiatric    Adjustment disorder with depressed mood - Primary     Thank you for allowing me to assist in Ms. Wendi Edmonds's care. If you have any questions, please contact me at [phone].      Johnna Arias, PhD, ABPP  Board Certified in Clinical Neuropsychology  [facility] - Department of Neurology    SUBJECTIVE     Cognitive Functioning   Cognitive screener: MoCA = 22/30 (September 2022)   Previous evaluation(s): Completed an evaluation with me in October 2022. Results revealed the following:   Compared to average range premorbid estimates (based on both demographic information and a word reading test), results of the current evaluation reveal  intact/at expectation attention, working memory, processing speed, language skills, and mild to moderate reductions on tasks of executive functioning, learning and memory (with improvement with recognition cues on most measures), and visuospatial constructional skills (with evidence of both reduced organization and planning as well as some constructional difficulty). She has insight into her difficulties and temporal orientation was intact.     Overall, a diagnosis of Mild Cognitive Impairment is most appropriate. Neuroimaging revealed a left basal ganglia infarct that could be responsible for some of these thinking changes in addition to the apathy that she is experiencing. However, it would not explain the worsening that Ms. Edmonds is noticing in her cognition. As such, her cognition deserves monitoring over time.  Onset & course of difficulty: gradually worsening over the past year.   Fluctuations: none  Examples:   Attention/Working Memory/Executive Functioning: doesn't have the patience to fool with the games like she once did. More distractible. Not playing on the computer as much as she once did.   Processing Speed: her ability to think and figure out stuff takes a little longer.   Language: reading is different. Not concentrating as well as she once did when reading.   Visuospatial:   Learning & Memory: memory is worse now. Continuing to get worse. It will eventually Pala around, but can't pull it up in the moment. That's the main thing. Doesn't feel that connected anymore. Lives alone and her son comes by but as far as interacting and seeing others regularly, she doesn't. Grandson around but he works and spends time with his friends so although he is here, he doesn't interact that much. Doesn't reach out to people. Has to sit and think about things for longer now, information doesn't just pop right into her head like it once did.   Exacerbating factors: none  Ameliorating factors: none  Medication for  "cognition: Namenda 10 mg. Taking it almost every night.      Daily Functioning   ADLs:    Bathing: Independent and without difficulty  Dressing: Independent and without difficulty  Grooming: Independent and without difficulty   Toileting: Independent and without difficulty  Transferring: Independent and without difficulty.  Eating: Independent and without difficulty.   IADLs:    Finances: Independent and without difficulty  Medication Mgmt: Independent. Some days she is really good about taking her medications, some days she just doesn't take them. Not because she is forgetting, she just doesn't.   Driving: one long drive to [place] a few months ago and did fine until the little roads to turn onto and had her sisters meet her and she followed. No problems in other areas. Realizes she has to sit and think about it more.   Household Mgmt: Independent and without difficulty Cooking/Meal Preparation: Independent and without difficulty.  Shopping: Independent and without difficulty.  Appointment Mgmt: Independent and without difficulty       Psychiatric/Neuropsychiatric Symptoms   Mood: "good"  Depression: yes, over the past few months. Not for a particular reason, but just getting a little worse. The whole aging thing. Drops things more due to arthritis and   Bell/Hypomania: no  Anxiety: unchanged  Stress: low  Social Withdrawal: yes - not talking as much at big family gatherings.   Neurovegetative Sxs:  Appetite: not getting as hungry as she once did. Does a brunch and dinner. Drinking some water but not as much as she should. Drinks one big cup of coffee.   Sleep: sleeps well and believes she is sleeping more than she was a year ago. Diagnosed with sleep apnea a few years ago and has a CPAP machine but doesn't use it. It aggravates her. Mild sleep apnea so tells herself it's not that bad. Notices she doesn't sleep as well with it on.   Energy: reduced. Doesn't feel like doing anything.    Hallucinations: " "no  Delusional/Paranoid Thinking: no  Impulsivity: no  Obsessive/Compulsive Behaviors: no  Disinhibition: no  Irritability/Agitation: no  Aggression: no  Apathy/Indifference: no  Other changes in personality: no. Harder and more frustrated to do things. Can be a bit more grouchy. Finds she is sitting way too much.      Physical Functioning   Tremor: no, arthritis impacting hands. gabapentin  Difficulty walking: arthritis interfering with walk. Has walking poles when she   Imbalance: no  Falls: no  Weakness: no  Trouble with fine motor movements: no Lightheadedness: last week having little dizzy spells but hasn't happened this week. Lasted about a week. A few seconds, less than min, happening a few times a day for a week.   Urinary Urgency: was having some bladder leakage, they want her to have a pessary. But she doesn't think that's necessary. It's not that bad.   Sensory Sxs: taste may have weakened a bit.   Pain: some due to arthritis but not a big problem   Physical Exercise Routine: not doing much at all.        Other Updates   Still drinks a couple of glasses or two gin and tonics each night at 5 PM but that's it.      OBJECTIVE     MENTAL STATUS AND OBSERVATIONS:   Appearance: Unable to assess   Alertness: Attentive and alert.   Orientation:   O x 4    Gait:  Unable to assess   Psychomotor:  Unable to assess   Handedness:  Right   Vision & Hearing:  Adequate for session   Speech/language: Normal in rate, rhythm, tone, and volume. No significant word finding difficulty observed. Comprehension was normal.   Mood/Affect:  The patients stated mood was "good." Affect was congruent with stated mood.    Interpersonal Behavior:  Rapport was quickly and easily established    Suicidality/Homicidality: Denied   Hallucinations/Delusions:  None evidenced or endorsed   Thought Content: Logical   Though Processes: Goal-directed   Insight & Judgment:  Appropriate   Participation in Interview:  Full     PROCEDURES/TESTS " ADMINISTERED: Performed a review of pertinent medical records, reviewed limits to confidentiality, conducted a clinical interview, and explained procedures.     This service was not originating from a related E/M service provided within the previous 7 days nor will  to an E/M service or procedure within the next 24 hours or my soonest available appointment.  Prevailing standard of care was able to be met in this audio-only visit.

## 2023-11-29 ENCOUNTER — TELEPHONE (OUTPATIENT)
Dept: NEUROLOGY | Facility: CLINIC | Age: 78
End: 2023-11-29
Payer: MEDICARE

## 2023-11-29 ENCOUNTER — PATIENT MESSAGE (OUTPATIENT)
Dept: NEUROLOGY | Facility: CLINIC | Age: 78
End: 2023-11-29
Payer: MEDICARE

## 2024-01-17 ENCOUNTER — DOCUMENTATION ONLY (OUTPATIENT)
Dept: REHABILITATION | Facility: HOSPITAL | Age: 79
End: 2024-01-17
Payer: MEDICARE

## 2024-01-17 DIAGNOSIS — Z91.81 AT RISK FOR FALLS: Primary | ICD-10-CM

## 2024-01-17 NOTE — PROGRESS NOTES
[facility]  Physcal Therapy Discharge Note    Name: Wendi Edmonds  [facility] Number: [id number]    Therapy Diagnosis:   Encounter Diagnosis   Name Primary?    At risk for falls Yes     Physician: Ana Luisa Muñoz    Physician Orders: Aquatic PT eval and Treat  Medical Diagnosis:  At risk for flals  Evaluation Date: 12/1/22      Date of Last visit: 4/18/23  Total Visits Received: 37    ASSESSMENT    The patient has attended 38 PT visits since the start of care. Subjectively she reported improved endurance and confidence with gait but continues with reports of forward leaning as she fatigues. Objectively she is reaching a plateau of progress with therapy.         Discharge reason: Patient has completed the physician's prescription    Goals:   Short Term Goals: 6 weeks   1. Patient will be independent in HEP & progressions.--Ongoing as of 2/3/23  2. Patient will achieve TUG score of 10 sec to demonstrate improved mobility--MET as of 3/16/23  3. The patient will achieve 5 sit to stand score of 14 seconds to demonstrate improved transfers and endurance. --MET as of 2/3/23     Long Term Goals: 12 weeks   1. The patient will demonstrate independence with extensive HEP.  2. Patient will achieve 5 sit to stand score of 12 seconds to demonstrate improved transfers & endurance.--MET as of 3/16/23  3. Patent is able to demonstrate MMT 4+/5 B knees and hips without pain reports during testing.  --Progressing as of 4/18/23  4. The patient is able to demonstrate SLS at least 10 seconds without use of hands for support. --Ongoing as of 4/18/23    PLAN   This patient is discharged from Physical Therapy      Kristin Yao, PT

## 2024-02-01 ENCOUNTER — OFFICE VISIT (OUTPATIENT)
Dept: URGENT CARE | Facility: CLINIC | Age: 79
End: 2024-02-01
Payer: MEDICARE

## 2024-02-01 VITALS
DIASTOLIC BLOOD PRESSURE: 80 MMHG | RESPIRATION RATE: 16 BRPM | TEMPERATURE: 98 F | WEIGHT: 158 LBS | BODY MASS INDEX: 25.39 KG/M2 | SYSTOLIC BLOOD PRESSURE: 130 MMHG | OXYGEN SATURATION: 98 % | HEART RATE: 75 BPM | HEIGHT: 66 IN

## 2024-02-01 DIAGNOSIS — B34.9 VIRAL SYNDROME: Primary | ICD-10-CM

## 2024-02-01 LAB
CTP QC/QA: YES
SARS-COV-2 AG RESP QL IA.RAPID: NEGATIVE

## 2024-02-01 PROCEDURE — 87811 SARS-COV-2 COVID19 W/OPTIC: CPT | Mod: QW,S$GLB,, | Performed by: NURSE PRACTITIONER

## 2024-02-01 PROCEDURE — 99203 OFFICE O/P NEW LOW 30 MIN: CPT | Mod: S$GLB,,, | Performed by: NURSE PRACTITIONER

## 2024-02-01 NOTE — PROGRESS NOTES
"Subjective:      Patient ID: Wendi Edmonds is a 78 y.o. female.    Vitals:  height is 5' 6" (1.676 m) and weight is 71.7 kg (158 lb). Her oral temperature is 97.7 °F (36.5 °C). Her blood pressure is 130/80 and her pulse is 75. Her respiration is 16 and oxygen saturation is 98%.     Chief Complaint: Nasal Congestion    This is a 78 y.o. female who presents today with a chief complaint of nasal congestion, runny nose and ear pain. Denies fever. Pt reports body aches to back, and states that eyes feel dry.       Sore Throat   This is a new problem. The current episode started yesterday. The problem has been unchanged. There has been no fever. The patient is experiencing no pain. Associated symptoms include congestion, ear pain and a hoarse voice. She has tried nothing for the symptoms. The treatment provided no relief.     HENT:  Positive for ear pain, congestion, postnasal drip, sinus pressure and sore throat.       Objective:     Physical Exam   Constitutional: She is oriented to person, place, and time. She appears well-developed. She is cooperative.  Non-toxic appearance. She does not appear ill. No distress.   HENT:   Head: Normocephalic.   Ears:   Right Ear: Hearing, tympanic membrane, external ear and ear canal normal.   Left Ear: Hearing, tympanic membrane, external ear and ear canal normal.   Nose: Congestion present. No mucosal edema, rhinorrhea or nasal deformity. No epistaxis. Right sinus exhibits no maxillary sinus tenderness and no frontal sinus tenderness. Left sinus exhibits no maxillary sinus tenderness and no frontal sinus tenderness.   Mouth/Throat: Uvula is midline and mucous membranes are normal. Mucous membranes are moist. No trismus in the jaw. Normal dentition. No uvula swelling. Posterior oropharyngeal erythema present. No oropharyngeal exudate or posterior oropharyngeal edema. Oropharynx is clear.   Eyes: Conjunctivae and lids are normal. No scleral icterus.   Neck: Trachea normal and " phonation normal. Neck supple. No edema present. No erythema present. No neck rigidity present.   Cardiovascular: Normal rate and regular rhythm.   Pulmonary/Chest: Effort normal and breath sounds normal. No respiratory distress. She has no decreased breath sounds. She has no wheezes. She has no rhonchi.   Abdominal: Normal appearance.   Musculoskeletal: Normal range of motion.         General: No deformity. Normal range of motion.   Neurological: She is alert and oriented to person, place, and time. She exhibits normal muscle tone. Coordination normal.   Skin: Skin is warm, dry, intact, not diaphoretic and not pale.   Psychiatric: Her speech is normal and behavior is normal. Judgment and thought content normal.   Nursing note and vitals reviewed.    Results for orders placed or performed in visit on 02/01/24   SARS Coronavirus 2 Antigen, POCT Manual Read   Result Value Ref Range    SARS Coronavirus 2 Antigen Negative Negative     Acceptable Yes       Assessment:     1. Viral syndrome        Plan:   Declines flu test at this time.     Viral syndrome  -     SARS Coronavirus 2 Antigen, POCT Manual Read

## 2024-02-05 DIAGNOSIS — G31.84 MILD COGNITIVE IMPAIRMENT: ICD-10-CM

## 2024-02-05 RX ORDER — MEMANTINE HYDROCHLORIDE 10 MG/1
10 TABLET ORAL 2 TIMES DAILY
Qty: 180 TABLET | Refills: 0 | Status: SHIPPED | OUTPATIENT
Start: 2024-02-05 | End: 2024-06-06 | Stop reason: SDUPTHER

## 2024-03-06 ENCOUNTER — OFFICE VISIT (OUTPATIENT)
Dept: NEUROLOGY | Facility: CLINIC | Age: 79
End: 2024-03-06
Payer: MEDICARE

## 2024-03-06 ENCOUNTER — LAB VISIT (OUTPATIENT)
Dept: LAB | Facility: HOSPITAL | Age: 79
End: 2024-03-06
Attending: PSYCHIATRY & NEUROLOGY
Payer: MEDICARE

## 2024-03-06 VITALS — HEIGHT: 63 IN | WEIGHT: 166.69 LBS | BODY MASS INDEX: 29.54 KG/M2

## 2024-03-06 DIAGNOSIS — G47.33 OSA (OBSTRUCTIVE SLEEP APNEA): Primary | ICD-10-CM

## 2024-03-06 DIAGNOSIS — F03.90 DEMENTIA, UNSPECIFIED DEMENTIA SEVERITY, UNSPECIFIED DEMENTIA TYPE, UNSPECIFIED WHETHER BEHAVIORAL, PSYCHOTIC, OR MOOD DISTURBANCE OR ANXIETY: ICD-10-CM

## 2024-03-06 LAB — VIT B12 SERPL-MCNC: 589 PG/ML (ref 210–950)

## 2024-03-06 PROCEDURE — 84425 ASSAY OF VITAMIN B-1: CPT | Performed by: PSYCHIATRY & NEUROLOGY

## 2024-03-06 PROCEDURE — 1126F AMNT PAIN NOTED NONE PRSNT: CPT | Mod: CPTII,S$GLB,, | Performed by: PSYCHIATRY & NEUROLOGY

## 2024-03-06 PROCEDURE — 1159F MED LIST DOCD IN RCRD: CPT | Mod: CPTII,S$GLB,, | Performed by: PSYCHIATRY & NEUROLOGY

## 2024-03-06 PROCEDURE — 99999 PR PBB SHADOW E&M-EST. PATIENT-LVL IV: CPT | Mod: PBBFAC,,, | Performed by: PSYCHIATRY & NEUROLOGY

## 2024-03-06 PROCEDURE — 99214 OFFICE O/P EST MOD 30 MIN: CPT | Mod: S$GLB,,, | Performed by: PSYCHIATRY & NEUROLOGY

## 2024-03-06 PROCEDURE — 82607 VITAMIN B-12: CPT | Performed by: PSYCHIATRY & NEUROLOGY

## 2024-03-06 NOTE — PROGRESS NOTES
[facility] - NEUROLOGY [facility]TH [place] [facility], [place]    Date: March 6, 2024   Patient Name: Wendi Edmonds   MRN: [id number]   PCP: Tatianna Muñoz  Referring Provider: No ref. provider found    Assessment:      This is Wendi Edmonds, 78 y.o. female with vascular risk factors, L CEA, subclinical L BG infarct, untreated GEORGE with MCI by neuropsychology 10/2022.     Plan:      -  Referral to sleep medicine  -  Continue namenda  -  Start ASA 81mg/d  -  Vitamin levels       Greater than 30 minutes spent in chart review, documentation, independent review of imaging, and face to face time with patient    I discussed side effects of the medications. I asked the patient to  stop the medication if She notices serious adverse effects as we discussed and to seek immediate medical attention at an ER.     David Castelan MD  [facility]   Department of Neurology    Subjective:     -  Presents back with daughter, continued memory difficulty but remains independent, lives with grandson and attends to cooking/chores/pet care/driving  -  Not using CPAP due to difficulty with equipment and daytime somnolence noted       HPI 10/2022:   Ms. Wendi Edmonds is a 78 y.o. female who presents with a chief complaint of memory    -  Presents back with daughter, continued memory difficulty but remains independent  -  Compliant with CPAP, sleeps on wedge d/t GEORGE as well  -  Estimates CEA 5-6 years ago with stenosis noted incidentally on work up of neck mass but had several TIA consisting of aphasia in 1990's    PAST MEDICAL HISTORY:  Past Medical History:   Diagnosis Date    Allergic rhinitis     Anxiety 1/19/2014    Arthritis 1/19/2014    HTN (hypertension) 1/19/2014    Hypothyroidism 1/19/2014    Migraines 1/19/2014    GEORGE (obstructive sleep apnea)     Osteoarthritis     Scoliosis        PAST SURGICAL HISTORY:  Past Surgical History:   Procedure Laterality Date    BACK SURGERY      FOOT  SURGERY      HYSTERECTOMY      NASAL SEPTUM SURGERY      TOTAL KNEE ARTHROPLASTY         CURRENT MEDS:  Current Outpatient Medications   Medication Sig Dispense Refill    albuterol (PROVENTIL/VENTOLIN HFA) 90 mcg/actuation inhaler Inhale 2 puffs into the lungs every 4 (four) hours as needed.      alendronate (FOSAMAX) 70 MG tablet       amoxicillin-clavulanate 875-125mg (AUGMENTIN) 875-125 mg per tablet Take 1 tablet by mouth 2 (two) times daily. (Patient not taking: Reported on 2/1/2024) 20 tablet 0    azelastine (ASTELIN) 137 mcg (0.1 %) nasal spray 2 sprays by Nasal route.      budesonide (PULMICORT) 0.5 mg/2 mL nebulizer solution mix ONE respule WITH 8oz distilled WATER & salt PACKET. irrigate nose WITH ONE-HALF cup of mixture IN EACH NOSTRIL TWICE DAILY      celecoxib (CELEBREX) 200 MG capsule Take 1 capsule by mouth.      famotidine (PEPCID) 20 MG tablet Take 20 mg by mouth.      irbesartan (AVAPRO) 300 MG tablet TAKE 1 TABLET BY MOUTH EVERY DAY 90 tablet 0    irbesartan-hydrochlorothiazide (AVALIDE) 300-12.5 mg per tablet       levocetirizine (XYZAL) 5 MG tablet Take 1 tablet by mouth.      levothyroxine (SYNTHROID) 112 MCG tablet       liothyronine (CYTOMEL) 5 MCG Tab       memantine (NAMENDA) 10 MG Tab Take 1 tablet (10 mg total) by mouth 2 (two) times daily. No further refill without appointment 180 tablet 0    montelukast (SINGULAIR) 10 mg tablet       naproxen (NAPROSYN) 500 MG tablet       NAPROXEN SODIUM (ALEVE ORAL) Take by mouth.      pantoprazole (PROTONIX) 40 MG tablet Take 1 tablet by mouth.      pravastatin (PRAVACHOL) 40 MG tablet       terbinafine HCl (LAMISIL) 250 mg tablet Take 1 tablet (250 mg total) by mouth once daily. 1 pill by mouth for fungal toenails.  Avoid alcohol intake while taking medication 30 tablet 1    tramadol (ULTRAM) 50 mg tablet        No current facility-administered medications for this visit.       ALLERGIES:  Review of patient's allergies indicates:  No Known  "Allergies    FAMILY HISTORY:  No family history on file.    SOCIAL HISTORY:  Social History     Tobacco Use    Smoking status: Never     Passive exposure: Past    Smokeless tobacco: Never       Review of Systems:  12 review of systems is negative except for the symptoms mentioned in HPI.        Objective:     Vitals:    03/06/24 1340   Weight: 75.6 kg (166 lb 10.7 oz)   Height: 5' 3" (1.6 m)       General: NAD, well nourished   Eyes: no tearing, discharge, no erythema   ENT: moist mucous membranes of the oral cavity, nares patent    Neck: Supple, full range of motion  Cardiovascular: Warm and well perfused, pulses equal and symmetrical  Lungs: Normal work of breathing, normal chest wall excursions  Skin: No rash, lesions, or breakdown on exposed skin  Psychiatry: Mood and affect are appropriate   Abdomen: soft, non tender, non distended  Extremeties: No cyanosis, clubbing or edema.    Neurological   MENTAL STATUS: Alert and oriented to person, place, and time. Provides own history in good detail   CRANIAL NERVES: Visual fields intact. PERRL. EOMI. Facial sensation intact. Face symmetrical. Hearing grossly intact. Full shoulder shrug bilaterally. Tongue protrudes midline   SENSORY: Sensation is intact to light touch throughout.     MOTOR: Normal bulk and tone. No pronator drift.    CEREBELLAR/COORDINATION/GAIT: Gait mildly unsteady, stiff and antalgic     "

## 2024-03-12 ENCOUNTER — TELEPHONE (OUTPATIENT)
Dept: SLEEP MEDICINE | Facility: CLINIC | Age: 79
End: 2024-03-12
Payer: MEDICARE

## 2024-03-12 LAB — VIT B1 BLD-MCNC: 92 UG/L (ref 38–122)

## 2024-03-12 NOTE — TELEPHONE ENCOUNTER
Spoke with patient again today and previously on 3/6/24 to schedule appointment, patient insurance is out of network for ALL SLEEP PROVIDERS, patient was informed and decided to decline scheduling for an out of pocket appointment, informed patient to contact insurance for in network providers.         ----- Message from Elida Dickens MA sent at 3/12/2024  1:37 PM CDT -----  Regarding: FW: Appointment Access    ----- Message -----  From: Ernesto Ellis  Sent: 3/12/2024   1:31 PM CDT  To: [facility] Clinical Support Staff  Subject: FW: Appointment Access                           Hello. This is the 2nd message that has been sent in regard to having Ms. Edmonds scheduled an appointment from the Sleep Disorders referral that had been placed back on 3/6/24. The original message had been marked done on 3/7/2024 at 2:24 PM by Piedad Maynard MA.     Thank you,     Ernesto Burr Navigator  ----- Message -----  From: Ernesto Ellis  Sent: 3/7/2024  12:56 PM CDT  To: [facility] Clinical Support Staff  Subject: Appointment Access                               Hello, when attempting to schedule Ms. Edmonds an appointment with the Sleep department, there were no available appointments that populated in Epic. Ms. Adame has a Ambulatory referral/consult to Sleep Disorders referral that was placed on 3/6/24 by David Castelan MD.      The patients diagnosis is GEORGE (obstructive sleep apnea) [G47.33].       I informed Ms. Edmonds that a member from the Sleep department clinical staff will contact her to schedule. I have updated the patients contact information in Epic.       Thank you,     Ernesto Madisonator

## 2024-05-20 ENCOUNTER — TELEPHONE (OUTPATIENT)
Dept: NEUROLOGY | Facility: CLINIC | Age: 79
End: 2024-05-20
Payer: MEDICARE

## 2024-06-05 ENCOUNTER — TELEPHONE (OUTPATIENT)
Dept: NEUROLOGY | Facility: CLINIC | Age: 79
End: 2024-06-05
Payer: MEDICARE

## 2024-06-05 DIAGNOSIS — G31.84 MILD COGNITIVE IMPAIRMENT: ICD-10-CM

## 2024-06-05 NOTE — TELEPHONE ENCOUNTER
----- Message from Ellen Strong sent at 6/5/2024 11:26 AM CDT -----  Regarding: Renew prescription  Contact: [phone]  Patient called requesting the provider renew memantine (NAMENDA) 10 MG Tab  medication. She said the pharmacy just called and said it need to be renewed. Please contact patient as soon as possible.     [facility] [place] - [address]  [address]  [place] [address]  Phone: [phone] Fax: [phone]

## 2024-06-06 ENCOUNTER — TELEPHONE (OUTPATIENT)
Dept: NEUROLOGY | Facility: CLINIC | Age: 79
End: 2024-06-06
Payer: MEDICARE

## 2024-06-06 RX ORDER — MEMANTINE HYDROCHLORIDE 10 MG/1
10 TABLET ORAL 2 TIMES DAILY
Qty: 180 TABLET | Refills: 0 | Status: SHIPPED | OUTPATIENT
Start: 2024-06-06

## 2024-06-20 ENCOUNTER — TELEPHONE (OUTPATIENT)
Dept: NEUROLOGY | Facility: CLINIC | Age: 79
End: 2024-06-20
Payer: MEDICARE

## 2024-06-20 NOTE — TELEPHONE ENCOUNTER
----- Message from Kyra Stanford sent at 6/20/2024 10:42 AM CDT -----  Regarding: Rx Refill  Contact: [phone]  Wendi Edmonds calling regarding Refills  (message) for #  memantine (NAMENDA) 10 MG Tab        [facility] [place] [place] - [address]  [address]  [place] [address]  Phone: [phone] Fax: [phone]

## 2024-08-15 ENCOUNTER — TELEPHONE (OUTPATIENT)
Dept: NEUROLOGY | Facility: CLINIC | Age: 79
End: 2024-08-15
Payer: MEDICARE

## 2024-08-15 NOTE — TELEPHONE ENCOUNTER
Called for our 8:00 am appointment but mailbox full and unable to leave a message. Called the house line and left a message.

## 2024-10-15 ENCOUNTER — TELEPHONE (OUTPATIENT)
Dept: NEUROLOGY | Facility: CLINIC | Age: 79
End: 2024-10-15
Payer: MEDICARE

## 2024-10-15 NOTE — TELEPHONE ENCOUNTER
Returned patient's call but she did not remember calling me/why she was calling. We discussed the plan to check in and update records prior to repeating testing tomorrow morning. She agreed to Dr. Vazquez leading the appointment. 4 minutes

## 2024-10-22 ENCOUNTER — OFFICE VISIT (OUTPATIENT)
Dept: NEUROLOGY | Facility: CLINIC | Age: 79
End: 2024-10-22
Payer: MEDICARE

## 2024-10-22 DIAGNOSIS — G31.84 MILD COGNITIVE IMPAIRMENT: Primary | ICD-10-CM

## 2024-10-22 PROCEDURE — 96133 NRPSYC TST EVAL PHYS/QHP EA: CPT | Mod: S$GLB,,, | Performed by: CLINICAL NEUROPSYCHOLOGIST

## 2024-10-22 PROCEDURE — 99499 UNLISTED E&M SERVICE: CPT | Mod: S$GLB,,, | Performed by: CLINICAL NEUROPSYCHOLOGIST

## 2024-10-22 PROCEDURE — 96139 PSYCL/NRPSYC TST TECH EA: CPT | Mod: S$GLB,,, | Performed by: CLINICAL NEUROPSYCHOLOGIST

## 2024-10-22 PROCEDURE — 96138 PSYCL/NRPSYC TECH 1ST: CPT | Mod: S$GLB,,, | Performed by: CLINICAL NEUROPSYCHOLOGIST

## 2024-10-22 PROCEDURE — 96132 NRPSYC TST EVAL PHYS/QHP 1ST: CPT | Mod: S$GLB,,, | Performed by: CLINICAL NEUROPSYCHOLOGIST

## 2024-10-31 ENCOUNTER — OFFICE VISIT (OUTPATIENT)
Dept: NEUROLOGY | Facility: CLINIC | Age: 79
End: 2024-10-31
Payer: MEDICARE

## 2024-10-31 DIAGNOSIS — G31.84 MILD COGNITIVE IMPAIRMENT: Primary | ICD-10-CM

## 2024-10-31 PROBLEM — F03.90 DEMENTIA, UNSPECIFIED DEMENTIA SEVERITY, UNSPECIFIED DEMENTIA TYPE, UNSPECIFIED WHETHER BEHAVIORAL, PSYCHOTIC, OR MOOD DISTURBANCE OR ANXIETY: Status: RESOLVED | Noted: 2022-09-13 | Resolved: 2024-10-31

## 2024-10-31 PROCEDURE — 99499 UNLISTED E&M SERVICE: CPT | Mod: S$GLB,,, | Performed by: CLINICAL NEUROPSYCHOLOGIST

## 2024-12-04 DIAGNOSIS — G31.84 MILD COGNITIVE IMPAIRMENT: ICD-10-CM

## 2024-12-05 RX ORDER — MEMANTINE HYDROCHLORIDE 10 MG/1
10 TABLET ORAL 2 TIMES DAILY
Qty: 180 TABLET | Refills: 0 | OUTPATIENT
Start: 2024-12-05

## 2025-06-06 ENCOUNTER — CLINICAL SUPPORT (OUTPATIENT)
Dept: REHABILITATION | Facility: HOSPITAL | Age: 80
End: 2025-06-06

## 2025-06-06 DIAGNOSIS — M19.90 ARTHRITIS: ICD-10-CM

## 2025-06-06 DIAGNOSIS — M85.80 OSTEOPENIA OF THE ELDERLY: ICD-10-CM

## 2025-06-06 DIAGNOSIS — M54.50 LOW BACK PAIN: ICD-10-CM

## 2025-06-06 DIAGNOSIS — M53.86 DECREASED RANGE OF MOTION OF INTERVERTEBRAL DISCS OF LUMBAR SPINE: Primary | ICD-10-CM

## 2025-06-06 DIAGNOSIS — R29.898 DECREASED STRENGTH OF TRUNK AND BACK: ICD-10-CM

## 2025-06-06 DIAGNOSIS — Z74.09 IMPAIRED FUNCTIONAL MOBILITY, BALANCE, GAIT, AND ENDURANCE: ICD-10-CM

## 2025-06-06 DIAGNOSIS — R29.898 WEAKNESS OF LOWER EXTREMITY, UNSPECIFIED LATERALITY: ICD-10-CM

## 2025-06-06 PROCEDURE — 97112 NEUROMUSCULAR REEDUCATION: CPT

## 2025-06-06 PROCEDURE — 97161 PT EVAL LOW COMPLEX 20 MIN: CPT
